# Patient Record
Sex: FEMALE | NOT HISPANIC OR LATINO
[De-identification: names, ages, dates, MRNs, and addresses within clinical notes are randomized per-mention and may not be internally consistent; named-entity substitution may affect disease eponyms.]

---

## 2024-05-20 ENCOUNTER — APPOINTMENT (OUTPATIENT)
Dept: ANTEPARTUM | Facility: CLINIC | Age: 19
End: 2024-05-20
Payer: SELF-PAY

## 2024-05-20 ENCOUNTER — OUTPATIENT (OUTPATIENT)
Dept: INPATIENT UNIT | Facility: HOSPITAL | Age: 19
LOS: 1 days | Discharge: HOME | End: 2024-05-20
Payer: SELF-PAY

## 2024-05-20 ENCOUNTER — ASOB RESULT (OUTPATIENT)
Age: 19
End: 2024-05-20

## 2024-05-20 ENCOUNTER — EMERGENCY (EMERGENCY)
Facility: HOSPITAL | Age: 19
LOS: 1 days | Discharge: STILL ADMITTED OR ADMINISTRATIVE | End: 2024-05-20
Admitting: EMERGENCY MEDICINE

## 2024-05-20 VITALS
SYSTOLIC BLOOD PRESSURE: 105 MMHG | RESPIRATION RATE: 18 BRPM | TEMPERATURE: 98 F | HEART RATE: 66 BPM | DIASTOLIC BLOOD PRESSURE: 67 MMHG | OXYGEN SATURATION: 100 %

## 2024-05-20 VITALS
SYSTOLIC BLOOD PRESSURE: 99 MMHG | TEMPERATURE: 98 F | HEART RATE: 73 BPM | DIASTOLIC BLOOD PRESSURE: 65 MMHG | RESPIRATION RATE: 14 BRPM

## 2024-05-20 VITALS — HEART RATE: 66 BPM | DIASTOLIC BLOOD PRESSURE: 55 MMHG | SYSTOLIC BLOOD PRESSURE: 97 MMHG

## 2024-05-20 DIAGNOSIS — O26.899 OTHER SPECIFIED PREGNANCY RELATED CONDITIONS, UNSPECIFIED TRIMESTER: ICD-10-CM

## 2024-05-20 LAB
APPEARANCE UR: CLEAR — SIGNIFICANT CHANGE UP
BILIRUB UR-MCNC: NEGATIVE — SIGNIFICANT CHANGE UP
COLOR SPEC: YELLOW — SIGNIFICANT CHANGE UP
DIFF PNL FLD: NEGATIVE — SIGNIFICANT CHANGE UP
GLUCOSE UR QL: NEGATIVE MG/DL — SIGNIFICANT CHANGE UP
KETONES UR-MCNC: NEGATIVE MG/DL — SIGNIFICANT CHANGE UP
LEUKOCYTE ESTERASE UR-ACNC: NEGATIVE — SIGNIFICANT CHANGE UP
NITRITE UR-MCNC: NEGATIVE — SIGNIFICANT CHANGE UP
PH UR: 7 — SIGNIFICANT CHANGE UP (ref 5–8)
PROT UR-MCNC: NEGATIVE MG/DL — SIGNIFICANT CHANGE UP
SP GR SPEC: 1.01 — SIGNIFICANT CHANGE UP (ref 1–1.03)
UROBILINOGEN FLD QL: 0.2 MG/DL — SIGNIFICANT CHANGE UP (ref 0.2–1)

## 2024-05-20 PROCEDURE — 76817 TRANSVAGINAL US OBSTETRIC: CPT | Mod: 26

## 2024-05-20 PROCEDURE — L9992: CPT

## 2024-05-20 PROCEDURE — 99213 OFFICE O/P EST LOW 20 MIN: CPT

## 2024-05-20 PROCEDURE — 76815 OB US LIMITED FETUS(S): CPT | Mod: 26

## 2024-05-20 NOTE — OB PROVIDER TRIAGE NOTE - NSHPPHYSICALEXAM_GEN_ALL_CORE
Assessment reveals VSS, abdomen soft, NT.   UA sent    Limited US performed- MVP 4.6, anterior placenta, M-mode 151-saved in asob  CL 3.9 no funneling or dynamic changes Assessment reveals VSS, abdomen soft, NT.   Patient appears calm and comfortable  UA sent    Limited US performed- MVP 4.6, anterior placenta, M-mode 151-saved in asob  CL 3.9 no funneling or dynamic changes    Urinalysis Basic - ( 20 May 2024 19:25 )    Color: Yellow / Appearance: Clear / S.009 / pH: x  Gluc: x / Ketone: Negative mg/dL  / Bili: Negative / Urobili: 0.2 mg/dL   Blood: x / Protein: Negative mg/dL / Nitrite: Negative   Leuk Esterase: Negative / RBC: x / WBC x   Sq Epi: x / Non Sq Epi: x / Bacteria: x Assessment reveals VSS, abdomen soft, NT.   Patient appears calm and comfortable  No CVA tenderness  UA sent    Limited US performed- MVP 4.6, anterior placenta, M-mode 151-saved in asob  CL 3.9 no funneling or dynamic changes    Urinalysis Basic - ( 20 May 2024 19:25 )    Color: Yellow / Appearance: Clear / S.009 / pH: x  Gluc: x / Ketone: Negative mg/dL  / Bili: Negative / Urobili: 0.2 mg/dL   Blood: x / Protein: Negative mg/dL / Nitrite: Negative   Leuk Esterase: Negative / RBC: x / WBC x   Sq Epi: x / Non Sq Epi: x / Bacteria: x

## 2024-05-20 NOTE — ED ADULT TRIAGE NOTE - CHIEF COMPLAINT QUOTE
patient arrives for abdominal cramping. Patient has not seen a OBGYN since arriving from [facility]. First pregnancy. Denies vaginal bleeding.

## 2024-05-20 NOTE — ED ADULT TRIAGE NOTE - PRO INTERPRETER NEED 2
Group Therapy Note    Date: April 22    Group Start Time: 1100  Group End Time: 9073  Group Topic: Psychoeducation    [facility]    Stacy Lakhani, HORTENCIAS    Group Therapy Note    Attendees: 7           Patient's Goal:  Pt will demonstrate improved interpersonal interaction    Notes:  Pt attended group and participated initially but then chose to observe activity due to feeling increased anxiety. Status After Intervention:  Unchanged    Participation Level:  Active Listener    Participation Quality: Appropriate, Sharing and Resistant      Speech:  hesitant      Thought Process/Content: Flight of ideas      Affective Functioning: Constricted/Restricted      Mood: anxious and dysphoric      Level of consciousness:  Alert and Preoccupied      Response to Learning: Able to verbalize current knowledge/experience, Able to retain information, Able to change behavior and Progressing to goal      Endings: None Reported    Modes of Intervention: Education, Support, Socialization, Problem-solving and Activity      Discipline Responsible: Psychoeducational Specialist      Signature:  Emily Manjarrez [facility]

## 2024-05-20 NOTE — OB PROVIDER TRIAGE NOTE - NSOBPROVIDERNOTE_OBGYN_ALL_OB_FT
Plan D/W Dr. Beltran [facility] normal discomforts of pregnancy/round ligament pain  Email sent to M. Hegarty of [facility] to [facility]  Return here for worsening of symptoms, nausea/vomiting, fever, leaking of fluid, vaginal bleeding.     [facility]- [phone] Plan D/W Dr. Beltran [facility] normal discomforts of pregnancy/round ligament pain  Email sent to M. Hegarty of [facility] to [facility]  Return here for worsening of symptoms, nausea/vomiting, fever, leaking of fluid, vaginal bleeding.     [facility]- [phone]    Discharge instructions given with  [id number], all questions answered

## 2024-05-20 NOTE — OB PROVIDER TRIAGE NOTE - HISTORY OF PRESENT ILLNESS
Patient Portuguese speaking, Portuguese  [id number] confirmed with patient that  may translate.   17yo  @ 17.2 presents with c/o lower abdominal pain x 3-4 days. Denies LOF, VB.   Arrived from [place] 1 month ago, last PN visit 4/15, wants to establish care.   Reports she was given due date by US of 10/26

## 2024-05-21 PROBLEM — Z00.00 ENCOUNTER FOR PREVENTIVE HEALTH EXAMINATION: Status: ACTIVE | Noted: 2024-05-21

## 2024-05-22 DIAGNOSIS — Z3A.17 17 WEEKS GESTATION OF PREGNANCY: ICD-10-CM

## 2024-05-22 DIAGNOSIS — O47.1 FALSE LABOR AT OR AFTER 37 COMPLETED WEEKS OF GESTATION: ICD-10-CM

## 2024-08-23 PROBLEM — Z86.79 PERSONAL HISTORY OF OTHER DISEASES OF THE CIRCULATORY SYSTEM: Chronic | Status: ACTIVE | Noted: 2024-05-20

## 2024-08-28 ENCOUNTER — APPOINTMENT (OUTPATIENT)
Dept: ANTEPARTUM | Facility: CLINIC | Age: 19
End: 2024-08-28
Payer: MEDICAID

## 2024-08-28 ENCOUNTER — ASOB RESULT (OUTPATIENT)
Age: 19
End: 2024-08-28

## 2024-08-28 ENCOUNTER — APPOINTMENT (OUTPATIENT)
Dept: OBGYN | Facility: CLINIC | Age: 19
End: 2024-08-28
Payer: MEDICAID

## 2024-08-28 VITALS
WEIGHT: 150 LBS | BODY MASS INDEX: 24.99 KG/M2 | SYSTOLIC BLOOD PRESSURE: 109 MMHG | DIASTOLIC BLOOD PRESSURE: 69 MMHG | HEIGHT: 65 IN

## 2024-08-28 PROCEDURE — ZZZZZ: CPT

## 2024-08-28 PROCEDURE — 76805 OB US >/= 14 WKS SNGL FETUS: CPT

## 2024-08-28 NOTE — DISCUSSION/SUMMARY
[FreeTextEntry1] : -complete sono done today WNL, no gross abnormalities noted (see official sono report) -recommended patient to do GCT as patient hasnt done it yet   -f/u PRN

## 2024-08-28 NOTE — HISTORY OF PRESENT ILLNESS
[FreeTextEntry1] : 19 y/o female presents to office for complete sono. Patient is currently 31 weeks pregnant and just transferred care to  yesterday as she came from [facility]. Complete sono done today.  Normal growth - Wt 4lbs(43%) Normal fluid. Normal movement. +FHR (see official sono report)

## 2024-09-12 ENCOUNTER — APPOINTMENT (OUTPATIENT)
Dept: OBGYN | Facility: CLINIC | Age: 19
End: 2024-09-12

## 2024-10-19 ENCOUNTER — INPATIENT (INPATIENT)
Facility: HOSPITAL | Age: 19
LOS: 3 days | Discharge: HOME | End: 2024-10-23
Attending: SPECIALIST | Admitting: SPECIALIST
Payer: MEDICAID

## 2024-10-19 ENCOUNTER — APPOINTMENT (OUTPATIENT)
Dept: ANTEPARTUM | Facility: CLINIC | Age: 19
End: 2024-10-19

## 2024-10-19 ENCOUNTER — ASOB RESULT (OUTPATIENT)
Age: 19
End: 2024-10-19

## 2024-10-19 VITALS
SYSTOLIC BLOOD PRESSURE: 107 MMHG | DIASTOLIC BLOOD PRESSURE: 67 MMHG | HEART RATE: 89 BPM | RESPIRATION RATE: 16 BRPM | TEMPERATURE: 98 F

## 2024-10-19 DIAGNOSIS — O26.899 OTHER SPECIFIED PREGNANCY RELATED CONDITIONS, UNSPECIFIED TRIMESTER: ICD-10-CM

## 2024-10-19 LAB
BASOPHILS # BLD AUTO: 0.03 K/UL — SIGNIFICANT CHANGE UP (ref 0–0.2)
BASOPHILS NFR BLD AUTO: 0.3 % — SIGNIFICANT CHANGE UP (ref 0–2)
BLD GP AB SCN SERPL QL: NEGATIVE — SIGNIFICANT CHANGE UP
EOSINOPHIL # BLD AUTO: 0.18 K/UL — SIGNIFICANT CHANGE UP (ref 0–0.5)
EOSINOPHIL NFR BLD AUTO: 1.5 % — SIGNIFICANT CHANGE UP (ref 0–6)
HCT VFR BLD CALC: 32.1 % — LOW (ref 34.5–45)
HGB BLD-MCNC: 10.4 G/DL — LOW (ref 11.5–15.5)
IANC: 9 K/UL — HIGH (ref 1.8–7.4)
IMM GRANULOCYTES NFR BLD AUTO: 1 % — HIGH (ref 0–0.9)
LYMPHOCYTES # BLD AUTO: 1.96 K/UL — SIGNIFICANT CHANGE UP (ref 1–3.3)
LYMPHOCYTES # BLD AUTO: 16.5 % — SIGNIFICANT CHANGE UP (ref 13–44)
MCHC RBC-ENTMCNC: 28.8 PG — SIGNIFICANT CHANGE UP (ref 27–34)
MCHC RBC-ENTMCNC: 32.4 GM/DL — SIGNIFICANT CHANGE UP (ref 32–36)
MCV RBC AUTO: 88.9 FL — SIGNIFICANT CHANGE UP (ref 80–100)
MONOCYTES # BLD AUTO: 0.61 K/UL — SIGNIFICANT CHANGE UP (ref 0–0.9)
MONOCYTES NFR BLD AUTO: 5.1 % — SIGNIFICANT CHANGE UP (ref 2–14)
NEUTROPHILS # BLD AUTO: 9 K/UL — HIGH (ref 1.8–7.4)
NEUTROPHILS NFR BLD AUTO: 75.6 % — SIGNIFICANT CHANGE UP (ref 43–77)
NRBC # BLD: 0 /100 WBCS — SIGNIFICANT CHANGE UP (ref 0–0)
NRBC # FLD: 0 K/UL — SIGNIFICANT CHANGE UP (ref 0–0)
PLATELET # BLD AUTO: 274 K/UL — SIGNIFICANT CHANGE UP (ref 150–400)
RBC # BLD: 3.61 M/UL — LOW (ref 3.8–5.2)
RBC # FLD: 13.5 % — SIGNIFICANT CHANGE UP (ref 10.3–14.5)
RH IG SCN BLD-IMP: POSITIVE — SIGNIFICANT CHANGE UP
RH IG SCN BLD-IMP: POSITIVE — SIGNIFICANT CHANGE UP
WBC # BLD: 11.9 K/UL — HIGH (ref 3.8–10.5)
WBC # FLD AUTO: 11.9 K/UL — HIGH (ref 3.8–10.5)

## 2024-10-19 PROCEDURE — 76819 FETAL BIOPHYS PROFIL W/O NST: CPT | Mod: 26

## 2024-10-19 PROCEDURE — 76815 OB US LIMITED FETUS(S): CPT | Mod: 26

## 2024-10-19 RX ORDER — OXYTOCIN IN D5W-0.2% SODIUM CL 15/250 ML
167 PLASTIC BAG, INJECTION (ML) INTRAVENOUS
Qty: 30 | Refills: 0 | Status: DISCONTINUED | OUTPATIENT
Start: 2024-10-19 | End: 2024-10-19

## 2024-10-19 RX ORDER — CHLORHEXIDINE GLUCONATE 40 MG/ML
1 SOLUTION TOPICAL DAILY
Refills: 0 | Status: DISCONTINUED | OUTPATIENT
Start: 2024-10-19 | End: 2024-10-20

## 2024-10-19 RX ORDER — OXYTOCIN IN D5W-0.2% SODIUM CL 15/250 ML
167 PLASTIC BAG, INJECTION (ML) INTRAVENOUS
Qty: 30 | Refills: 0 | Status: COMPLETED | OUTPATIENT
Start: 2024-10-19 | End: 2024-10-19

## 2024-10-19 RX ORDER — CHLORHEXIDINE GLUCONATE 40 MG/ML
1 SOLUTION TOPICAL DAILY
Refills: 0 | Status: DISCONTINUED | OUTPATIENT
Start: 2024-10-19 | End: 2024-10-19

## 2024-10-19 NOTE — OB PROVIDER H&P - HISTORY OF PRESENT ILLNESS
19yo  @ 39.4 presents with c/o contractions every 5 minutes. Denies LOF, VB and reports GFM.   Patient arrived from [place] at around 13 weeks, was seen in triage at 17 weeks and referred to clinic. Patient's  reports clinic did not answer so they started care with Dr. Coley.

## 2024-10-19 NOTE — OB RN PATIENT PROFILE - NS PRO TDAP CONTRAINDICATIONS
PTNS Questionnaire  Treatment # 5    Symptom Follow Up:  Urinary Incontinence:improved    Incomplete Emptying:Yes  Urinary Urgency: improved    Urinary Frequency:  improved    Nocturia: No  Dysuria: No  Leaks during [place]autel: No    Medical Review:  Caffeine Intake ( per day):3 cup  Alcohol Intake (cups/day): 0  cup and none for 3 weeks   Daytime Voids (#/day):8  Nighttime Voids (#/night) with your: 0  Incontinence Episodes (#/day):1 every other day       Prior Testing  Urine Tests: Denies UTI symptoms  Culture: None    Procedure  Needle Placement: right  Treatment Setting:  Stimulation(min): 30  Feeling/Response:  Treatment Plan:    Post Procedure: Patient did tolerate the procedure well.     Patient tolerating well. Continue PTNS.  
None

## 2024-10-19 NOTE — OB PROVIDER TRIAGE NOTE - HISTORY OF PRESENT ILLNESS
17yo  @ 39.4 presents with c/o contractions every 5 minutes. Denies LOF, VB and reports GFM.   Patient arrived from [place] at around 13 weeks, was seen in triage at 17 weeks and referred to clinic. Patient's  reports clinic did not answer so they started care with Dr. Coley.    17yo  @ 39.4 presents with c/o contractions every 5 minutes. Denies LOF, VB and reports GFM.   Patient arrived from [place] at around 13 weeks, was seen in triage at 17 weeks and referred to clinic. Patient's  reports clinic did not answer so they started care with Dr. Coley.

## 2024-10-19 NOTE — OB PROVIDER H&P - NSHPPHYSICALEXAM_GEN_ALL_CORE
Assessment reveals VSS, abdomen soft, NT, gravid.   VE 1/50/-3  Cat 1 FHT, ctx irregular on toco  Reactive NST  BPP 8/8, breech, MALIKA 12, anterior placenta-saved in asob    Patient reports she was told by Dr. Coley last week that she was breech but that she could have normal delivery.   Desires     1640: Chinese  [phone]  Dr. Haywood at bedside. Plan of care discussed and all questions answered.   Consulted with Dr. Shultz of [facility]  Patient for admission and re-check at 1900. If cervical change, for ECV this evening, if no cervical changes, for ECV and IOL in am.

## 2024-10-19 NOTE — OB RN TRIAGE NOTE - FALL HARM RISK - UNIVERSAL INTERVENTIONS
Bed in lowest position, wheels locked, appropriate side rails in place/Call bell, personal items and telephone in reach/Instruct patient to call for assistance before getting out of bed or chair/Non-slip footwear when patient is out of bed/[place] to call system/Physically safe environment - no spills, clutter or unnecessary equipment/Purposeful Proactive Rounding/Room/bathroom lighting operational, light cord in reach

## 2024-10-19 NOTE — OB PROVIDER TRIAGE NOTE - NSHPPHYSICALEXAM_GEN_ALL_CORE
Assessment reveals VSS, abdomen soft, NT, gravid.   VE 1/50/-3  Cat 1 FHT, ctx irregular on toco  BPP Assessment reveals VSS, abdomen soft, NT, gravid.   VE 1/50/-3  Cat 1 FHT, ctx irregular on toco  Reactive NST  BPP 8/8, breech, MALIKA 12, anterior placenta-saved in asob    Patient reports she was told by Dr. Coley last week that she was breech but that she could have normal delivery.   Desires     1640: Occitan  [id number]  Dr. Haywood at bedside. Plan of care discussed and all questions answered.   Consulted with Dr. Shultz of [facility]  Patient for admission and re-check at 1900. If cervical change, for ECV this evening, if no cervical changes, for ECV and IOL in am.

## 2024-10-19 NOTE — CHART NOTE - NSCHARTNOTEFT_GEN_A_CORE
Ob attending    19 yo G1Pp at 39+ weeks breech-  ve 1/70 patient states 1 ctx/hour  no LOF    patient wishes vaginal delivery   cat 1 fht  toco scant    I discussed plan with  Dr. Lexii CACERES-- we will hydrate patient overnight and watch cervical exam -plan for version with epidural in am and induction if successful. If not successful will have c/s tomorrow.  Will recheck exam in 2 hours, if change noted, Dr. Shultz will attempt version tonight with epidural.  Plan discussed with patient and  and agree.      Onel Ruff

## 2024-10-19 NOTE — OB RN PATIENT PROFILE - FALL HARM RISK - UNIVERSAL INTERVENTIONS
Bed in lowest position, wheels locked, appropriate side rails in place/Call bell, personal items and telephone in reach/Instruct patient to call for assistance before getting out of bed or chair/Non-slip footwear when patient is out of bed/[place] to call system/Physically safe environment - no spills, clutter or unnecessary equipment/Purposeful Proactive Rounding/Room/bathroom lighting operational, light cord in reach

## 2024-10-19 NOTE — CHART NOTE - NSCHARTNOTEFT_GEN_A_CORE
pt seen and evalauted at bedside for cervical change    VE unchanged 1/50/-3, firm, posterior, intact     Discussed w. Dr Chastity ALCAZARM

## 2024-10-19 NOTE — OB RN TRIAGE NOTE - FALL HARM RISK - ATTEMPT OOB
Patient : John Tam Age: 85 year old Sex: male   MRN: [id number] Encounter Date: 7/26/2023      History     Chief Complaint   Patient presents with   • Rectal Bleeding     HPI  John Tam is a 85 year old male who presents to the ED with complaints of rectal bleeding.  Extensive past medical history.  He is on aspirin 81, denies any other blood thinners.  He presents two family members.    Patient states that there were some blood spots on the sheets and then today states that there was an increase in blood, even when passing flatus. He notes BRBPR with clots in his brief. No bloody bowel movements.    States that he has had hemorrhoids before which had to be treated with sitz baths. Follows with Dr. Hooks, GI.     Patient denies abdominal pain, nausea, vomiting, diarrhea, lightheadedness, fatigue.  He states that he otherwise feels “fine”.    Reviewed results from his last colonoscopy completed 3/20/2023 for rectal bleeding.  No colitis was seen, no colonic masses are noted.  In the lower rectum there is evidence of a healing stercoral room lower rectal ulcer also radiation proctitis is noted.  There is active oozing from blood vessels just above the anal verge above the dentate line.  This was treated with argon plasma coagulation.    No Known Allergies    Discharge Medication List as of 7/27/2023 12:18 AM      Prior to Admission Medications    Details   oxybutynin (DITROPAN-XL) 10 MG 24 hr tablet Take 10 mg by mouth daily.Historical Med      senna (SENOKOT) 8.6 MG tablet Take 1 tablet by mouth daily.Historical Med      torsemide (DEMADEX) 20 MG tablet Take 2.5 tablets by mouth daily.Historical Med, Disp-60 tablet, R-5This is a dose increase on 05/18/23 that was suggested by oncology.      gabapentin (NEURONTIN) 300 MG capsule Take 3 capsules by mouth nightly.Eprescribe, Disp-90 capsule, R-5      allopurinol (ZYLOPRIM) 300 MG tablet Take 300 mg by mouth daily.Historical Med      potassium chloride  (KLOR-CON) 10 MEQ ER tablet Take 1 tablet by mouth daily.Eprescribe, Disp-90 tablet, R-3      AMIODarone (PACERONE) 100 MG tablet Take 1 tablet by mouth daily.Eprescribe, Disp-90 tablet, R-2      tamsulosin (FLOMAX) 0.4 MG Cap Take 1 capsule by mouth daily.Eprescribe, Disp-90 capsule, R-3      atorvastatin (LIPITOR) 20 MG tablet TAKE 1 TABLET BY MOUTH EVERY DAYEprescribe, Disp-90 tablet, R-3      diclofenac (VOLTAREN) 1 % gel Apply 2 g topically in the morning and 2 g at noon and 2 g in the evening and 2 g before bedtime. Use as directed to right shoulderEprescribe, Topical, 4 TIMES DAILY, Disp-100 g, R-0Osteoarthritis:   - Lower extremity (eg, knee): Apply 4 g to each affe cted area up to 4 times daily; max dose per joint: 16 g/day    - Upper extremity (eg, hand): Apply 2 g to each affected area up to 4 times daily; max dose per joint: 8 g/day   - Max total body dose (all combined joints): 32 g/day     Pain, acute (eg, mus culoskeletal):    - (off-label use) Apply up to 4 g to each affected area up to 4 times daily; max dose per area: 16 g/day   - Max total body dose (all combined areas): 32 g/day.    - NOTE: Dosing is based on general recommendations from  lab eling.            acetaminophen (Tylenol 8 Hour) 650 MG CR tablet Take 1 tablet by mouth 2 times daily. May also take 1 tablet every 4 hours as needed for Pain or Fever (max 4,000 mg/ 24 hour).OTC      ferrous sulfate 325 (65 FE) MG tablet Take 1 tablet by mouth 2 (two) times a day.Historical Med      aspirin 81 MG chewable tablet Chew 81 mg by mouth at bedtime.Historical Med      vitamin B-12 (CYANOCOBALAMIN) 50 MCG tablet Take 50 mcg by mouth daily.Historical Med      Coenzyme Q10 (Co Q10) 200 MG Cap Take 1 capsule by mouth daily.Historical Med      calcium carbonate-vitamin D (CALTRATE+D) 600-400 MG-UNIT per tablet Take 2 tablets by mouth 2 times daily. Historical Med      vitamin - therapeutic multivitamins w/minerals (CENTRUM SILVER,THERA-M)  TABS Take 2 tablets by mouth daily. Historical Med      Polyethylene Glycol Powder Historical Med      DOCUSATE SODIUM PO Historical Med      imatinib (Gleevec) 100 MG tablet Take 300 mg by mouth daily.Historical Med      hydroCORTisone (ANUSOL-HC) 25 MG suppository Place 1 suppository rectally nightly. Take 1 per rectum for 2 weeks and discontinue if symptoms improve. May take up to 1 month if necessary.Eprescribe, Disp-30 suppository, R-1      prochlorperazine (COMPAZINE) 10 MG tablet Take 1 tablet by mouth every 4 hours as needed for Nausea.Historical Med      DISPENSE Orthotic insert for right foot to use with current Chas brace and new brace straps.  NPI: [id number], Disp-1 each, R-0      DISPENSE Right foot Chas Brace. NPI #[id number].Normal, Disp-1 each, R-0      cephalexin (KEFLEX) 500 MG capsule Take 2,000 mg by mouth. Before dental proceduresHistorical Med      Probiotic Product (Probiotic Acidophilus Beads) Cap Probiotic Acidophilus Beads CAPSHistorical Med             Past Medical History:   Diagnosis Date   • Anemia due to gastrointestinal blood loss 02/27/2017    Admitted 2/27/2017 with drop in hemoglobin to 5.4.   • Astigmatism with presbyopia, bilateral     and hypermetropia   • AV block     DDDR pm 8/2012   • Benign prostatic hypertrophy with urinary obstruction    • Bilateral carotid artery disease, nonobstructive    • Bilateral carpal tunnel syndrome    • Bilateral pneumonia 1/5/2015   • Cerebrovascular accident (CMD) 08/2013    Low density foci in the left corona radiata may be related to age-indeterminate infarcts.   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   • Coronary artery disease     CABG 2004   • Dry eye syndrome of both eyes    • Elevated PSA    • Fracture    • Gastroesophageal reflux disease    • Hyperlipidemia    • Hypertension    • Iron deficiency anemia 1/5/2015   • Left ventricular hypertrophy     • Leg weakness 1/18/2015   • Loosening of total shoulder replacement (CMD) 8/28/2021   • Myocardial infarction (CMD)    • Obstructive sleep apnea on CPAP    • Osteoarthritis of multiple joints    • Pacemaker 8/6/2012    DDDR 8/2012 high grade AV block   • Paresthesia of left leg 10/3/2018   • Paroxysmal atrial fibrillation (CMD)    • Peripheral neuropathy    • Pneumonia age 5   • Prostate cancer (CMD) 05/20/2016    [place] score 3+4=7.   • Puckering of macula, bilateral    • Pulmonary hypertension (CMD)    • Pyogenic arthritis of left shoulder region (CMD) 12/2/2021   • Radiation proctitis 6/20/2017    Radiation for prostate cancer is developed radiation proctitis with bleeding complicated by need for anticoagulation for prior stroke  Berny Hooks MD   6/20/2017    • Rectal bleeding 1/27/2021   • Respiratory failure (CMD) 1/5/2015   • Sick sinus syndrome (CMD) 2/28/2013   • Stenosis of left vertebral artery 01/22/2018    Left vertebral artery occlusion with intracranial reconstitution likely from the right vertebral artery.   • Stercoral ulcer of rectum 1/17/2022   • Subclinical hypothyroidism    • Transfusion history 1/2015    2 units   • Urinary incontinence        Past Surgical History:   Procedure Laterality Date   • APPENDECTOMY     • CARDIAC ELECTROPHYSIOLOGY STUDY AND ABLATION  01/28/2015   • CARPAL TUNNEL RELEASE Right 03/28/2017   • CATARACT EXTRACTION, BILATERAL Left 12/01/2008    Right - 11/10/2008   • COLONOSCOPY  01/26/2015    Moderately severe pandiverticulosis, suboptimal bowel prep, follow up in 3 years. Dr. Berny Hooks.   • COLONOSCOPY  03/01/2017    Multiple polyps, sigmoid diverticulosis, internal hemorrhoids, follow up in 3 years. Dr. Jamey Weber.   • COLONOSCOPY  05/19/2017    Radiation proctitis with multiple neovascularization lesions (AVM) active bleeding with fresh blood, hemostasis achieved with argon plasma coagulation with ablation of AVMs. Sigmoid diverticulosis. Internal  No hemorrhoids. Fresh blood and the clots in the rectum and sigmoid colon and stool in the right colon.   • COLONOSCOPY  06/09/2017    Sigmoid colon polyp, pandiverticulosis mild to moderate in severity, rectal bleeding secondary to post radiation proctopathy, follow up in 5 years.   • COLONOSCOPY  06/12/2018    Radiation proctitis with multiple neovascularization lesions (AVM) with fresh blood, hemostasis achieved with argon plasma coagulation with ablation of AVMs, ascending colon polyp 7 m removed by snare polypectomy, hepatic flexure polyp 7 mm removed by snare polypectomy, sigmoid diverticulosis, and a rectal polyp 10 mm sessile removed by snare polypectomy and cautery. Follow up in 3 years.   • COLONOSCOPY  07/08/2021   • COLONOSCOPY  03/20/2023    polyp removed. Follow-up colonoscopy as needed.   • CORONARY ANGIOGRAM - CV  2010    patent LIMA, 80% ostial radial with FFR .88, presumed spasm, 75% LAD, 50% OM, 30-35% RCA, LM 40-50%   • CORONARY ARTERY BYPASS GRAFT  05/25/2004    2-vessel off-pump coronary artery bypass. LIMA-LAD, radial-OM.   • EGD  07/08/2021   • ESOPHAGOGASTRODUODENOSCOPY  01/09/2015    Gastritis, otherwise normal. Dr. Berny Hooks.   • ESOPHAGOGASTRODUODENOSCOPY  02/28/2017    Gastritis, no active bleeding or bleeding stigmata. Dr. Jamey Weber.   • ESOPHAGOGASTRODUODENOSCOPY  05/19/2017    Gastritis. No active bleeding.   • EXCISIONAL HEMORRHOIDECTOMY  01/26/2018    Grade 4, 3 Columns- Dr Joe Bacon MD    • FLEXIBLE SIGMOIDOSCOPY  07/21/2017    Nonbleeding external hemorrhoids. Bleeding radiation proctopathy with associated ulceration.   • FLEXIBLE SIGMOIDOSCOPY  01/12/2018    Large external hemorrhoids, residual but improved radiation proctopathy, mild diverticulosis of the distal sigmoid colon.   • FOOT SURGERY Left    • HB CAPSULOTOMY W/LASER (YAG) Bilateral 06/01/2012   • HB ENDOSCOPY CAPSULE GI TRACT  03/08/2017    Negative.   • LEFT HEART CATH,PERCUTANEOUS  05/24/2004   • LUMBAR  FUSION  10/01/2014    L2-L5. Dr. Joe Gallegos.   • OSTEOTOMY TIBIA     • PACEMAKER IMPLANT  2012    Medtronic DDDR   • PTCA  2010   • QUADRICEPS REPAIR Right 2007    Right knee distal quadraceps tendon repair, [place] or [place] type. Dr. Kirby Hanson.   • SKIN CANCER EXCISION  04/10/2008    Basal cell carcinoma from right anterior shoulder.   • TONSILLECTOMY AND ADENOIDECTOMY     • TOTAL HIP ARTHROPLASTY Right    • TOTAL KNEE ARTHROPLASTY Left    • TOTAL SHOULDER ARTHROPLASTY Right 2004    Dr. Kvng Shipman.       Family History   Problem Relation Age of Onset   • Heart disease Father         Valvular   • Dementia/Alzheimers Mother    • Osteoarthritis Mother    • Thyroid Mother    • Alcohol Abuse Sister    • Hypertension Sister    • Diabetes Maternal Grandmother        Social History     Tobacco Use   • Smoking status: Former     Current packs/day: 0.00     Average packs/day: 1 pack/day for 16.0 years (16.0 ttl pk-yrs)     Types: Cigarettes     Start date: 1961     Quit date: 1977     Years since quittin.6   • Smokeless tobacco: Never   Vaping Use   • Vaping Use: never used   Substance Use Topics   • Alcohol use: Yes     Alcohol/week: 7.0 standard drinks of alcohol     Types: 7 Glasses of wine per week   • Drug use: No       E-cigarette/Vaping   • E-Cigarette/Vaping Use Never Used      E-Cigarette/Vaping Substances & Devices       Review of Systems  10 SYSTEMS REVIEWED AND NEGATIVE OR NONCONTRIBUTORY UNLESS OTHERWISE NOTED IN HPI    Physical Exam     ED Triage Vitals [23 2305]   ED Triage Vitals Group      Temp 97.8 °F (36.6 °C)      Heart Rate 65      Resp 20      /61      SpO2 96 %      EtCO2 mmHg       Height 5' 7\" (1.702 m)      Weight 173 lb 1 oz (78.5 kg)      Weight Scale Used Standing scale      BMI (Calculated) 27.1      IBW/kg (Calculated) 66.1       Physical Exam  Nursing notes reviewed  Gen:   AAO in NAD, resting comfortably in bed. Answers questions  appropriately and follows commands appropriately. Appears well nourished and stated age  Head:  atraumatic  HEENT:   PERRL. Sclera anicteric   Mouth:  Patent without swelling.  Moist well perfused mucous membranes. Trachea midline, palate raise symmetric  Neck: No masses, lymphadenopathy, moving head freely  Resp: Clear to auscultation without wheezes, rhonchi, or rales.  CVS: Rate rhythm regular  ABD:  Positive BS, nontender, soft. no guarding or rebound  : rectal exam reveals hematochezia. No palpable hemorrhoids.  Back: No CVA tenderness, deformities, swelling or ecchymosis  Ext:  No clubbing, cyanosis. Bilateral 1+ edema of lower legs.  No joint swelling or erythema  Skin:  Well perfused, no significant rashes. No jaundice  Neuro: Face symmetric. Conjugate gaze. Speech clear. No obvious focal deficit  Lymph: No significant cervical lymphadenopathy  Psych: Alert and interactive with normal affect and interaction  ED Course     Procedures    Lab Results     Results for orders placed or performed during the hospital encounter of 07/26/23   Comprehensive Metabolic Panel   Result Value Ref Range    Fasting Status      Sodium 141 135 - 145 mmol/L    Potassium 3.6 3.4 - 5.1 mmol/L    Chloride 106 97 - 110 mmol/L    Carbon Dioxide 30 21 - 32 mmol/L    Anion Gap 9 7 - 19 mmol/L    Glucose 106 (H) 70 - 99 mg/dL    BUN 39 (H) 6 - 20 mg/dL    Creatinine 1.42 (H) 0.67 - 1.17 mg/dL    Glomerular Filtration Rate 48 (L) >=60    BUN/Cr 27 (H) 7 - 25    Calcium 8.7 8.4 - 10.2 mg/dL    Bilirubin, Total 0.5 0.2 - 1.0 mg/dL    GOT/AST 44 (H) <=37 Units/L    GPT/ALT 45 <64 Units/L    Alkaline Phosphatase 105 45 - 117 Units/L    Albumin 3.6 3.6 - 5.1 g/dL    Protein, Total 6.5 6.4 - 8.2 g/dL    Globulin 2.9 2.0 - 4.0 g/dL    A/G Ratio 1.2 1.0 - 2.4   Prothrombin Time (INR/PT)   Result Value Ref Range    Protime- PT 11.1 9.7 - 11.8 sec    INR 1.1     Partial Thromboplastin Time (PTT)   Result Value Ref Range    PTT 29 22 - 30 sec    CBC with Automated Differential (performable only)   Result Value Ref Range    WBC 6.1 4.2 - 11.0 K/mcL    RBC 3.04 (L) 4.50 - 5.90 mil/mcL    HGB 10.0 (L) 13.0 - 17.0 g/dL    HCT 30.7 (L) 39.0 - 51.0 %    .0 (H) 78.0 - 100.0 fl    MCH 32.9 26.0 - 34.0 pg    MCHC 32.6 32.0 - 36.5 g/dL    RDW-CV 17.8 (H) 11.0 - 15.0 %    RDW-SD 66.3 (H) 39.0 - 50.0 fL     140 - 450 K/mcL    NRBC 0 <=0 /100 WBC    Neutrophil, Percent 77 %    Lymphocytes, Percent 10 %    Mono, Percent 11 %    Eosinophils, Percent 1 %    Basophils, Percent 1 %    Immature Granulocytes 0 %    Absolute Neutrophils 4.7 1.8 - 7.7 K/mcL    Absolute Lymphocytes 0.6 (L) 1.0 - 4.0 K/mcL    Absolute Monocytes 0.7 0.3 - 0.9 K/mcL    Absolute Eosinophils  0.0 0.0 - 0.5 K/mcL    Absolute Basophils 0.0 0.0 - 0.3 K/mcL    Absolute Immature Granulocytes 0.0 0.0 - 0.2 K/mcL   Stool occult blood POC   Result Value Ref Range    gFOB (guaiac) - Occult Blood Positive Negative    Internal Procedural Controls Acceptable Yes     TEST LOT NUMBER [id number]     TEST LOT EXPIRATION DATE 2-26        EKG Results     Radiology Results     Imaging Results    None         ED Medication Orders (From admission, onward)    None               [facility]  Patient is an 85-year-old male who presents with his family members for concern of bright red blood per rectum.  Patient otherwise states he has been feeling himself, no abdominal pain, nausea vomiting, fevers, lightheadedness or fatigue.  Differential broad.  Labs obtained showed hemoglobin 10.0, this is increased from his last hemoglobin of 7.8 which was obtained for months ago.  He is without signs of large blood loss anemia on exam and HPI.  Creatinine is elevated to 1.42 from his baseline of 1.02.  Unfortunately, nursing staff did lose IV access, and therefore the decision to not give a small IV bolus of fluids was made in conjunction with Dr. Johnson.  I did recommend that he increase his water intake in order to compensate  for this.  BUN/creatinine elevated to 27, BUN elevated to 39.  Suspect secondary to acute GI blood loss.  Patient is on baby aspirin for his history of AFib.  He does state that he is too afraid to stop taking those due to risk of increased clots.  I will have him talk to GI doctor to see if they would like him to stop this.   I did discuss with patient that at this time I think that he would be reasonable for outpatient follow-up closely with Dr. Hooks, and GI.  Patient does feel comfortable with this decision.  I did place an ASA referral for him to get a scope completed.  I recommend close follow-up, returning if he has 3 large bloody bowel movements or if he starts to have symptoms secondary to anemia as he would need his labs rechecked.  I also recommend follow-up with his primary care to ensure his creatinine is returning to baseline, do recommend that he have this redrawn on Monday.    Clinical Impression     ED Diagnosis   1. Blood in stool  SERVICE TO GASTROENTEROLOGY      2. Elevated serum creatinine            Disposition        Discharge 7/27/2023 12:17 AM  John Tam discharge to home/self care.      Treatment:  Discharge Medication List as of 7/27/2023 12:18 AM          Follow Up:  Berny Hooks MD  [address] [facility] [address] [phone]    Schedule an appointment as soon as possible for a visit         Discussed with patient ED findings and plan for discharge. Patient was given ED return precaution warnings, discharge instructions, and follow up information to go home with. Patient understands and agrees with plan for discharge. Any questions have been answered.     The patient is discharged home in stable condition. See discharge papers for discharge instructions and attachments.    Closure:  The patient understands that this is a provisional diagnosis. Provisional diagnosis can and do change. The diagnosis that he is discharged with today is based on the symptoms with which  he presented today. If any new symptoms occur or worsen, he should seek immediate attention for re-evaluation.  Any symptoms that persist or fail to completely resolve require further evaluation by his other healthcare provider(s).         Mariela Belle PA-C  07/28/23 [id number]

## 2024-10-19 NOTE — OB PROVIDER TRIAGE NOTE - NSOBPROC_OBGYN_ALL_OB
Problem: Physical Therapy  Goal: Physical Therapy Goal  Description: Goals to be met by: d/c     Patient will increase functional independence with mobility by performin. Supine to sit with Stand-by Assistance  2. Sit to supine with Stand-by Assistance  3. Sit to stand transfer with Stand-by Assistance  4. Bed to chair transfer with Stand-by Assistance using Rolling Walker  5. Gait  x 30 feet with Stand-by Assistance using Standard Walker.     Outcome: Ongoing, Progressing      None Done

## 2024-10-19 NOTE — OB PROVIDER TRIAGE NOTE - AVIAN FLU SYMPTOMS
December 12, 2023         Patient: Jhoana Freeman   YOB: 2017   Date of Visit: 12/12/2023           To Whom it May Concern:    Jhoana Freeman was seen in my clinic on 12/12/2023. Please excuse her school absence today.     If you have any questions or concerns, please don't hesitate to call.        Sincerely,           Mildred Gordon M.D.  Electronically Signed     
No

## 2024-10-19 NOTE — OB PROVIDER H&P - NSLOWPPHRISK_OBGYN_A_OB
No previous uterine incision/[place] Pregnancy/Less than or equal to 4 previous vaginal births/No known bleeding disorder

## 2024-10-19 NOTE — OB PROVIDER H&P - ASSESSMENT
Admit for early labor in breech presentation  Routine admission orders  For re-check at 1900.      If cervical change than ECV will be attempted this evening, if no change than ECV and IOL in AM  Pain management PRN  D/W Dr. Chastity Hayes  used, all questions answered.     Risks, benefits, alternatives, and possible complications have been discussed in detail with the patient in her native language. Pre-admission, admission, and post admission procedures and expectations were discussed in detail. All questions answered, all appropriate hospital consents were signed. Anticipate normal vaginal delivery.    Informed consent was obtained. The following was discussed:    - Induction/augmentation of labor: use of medication and/or cook balloon to begin or enhance labor    - Obstetrical management including internal fetal/contraction monitoring    - Normal vaginal delivery    - Possible  section

## 2024-10-20 ENCOUNTER — TRANSCRIPTION ENCOUNTER (OUTPATIENT)
Age: 19
End: 2024-10-20

## 2024-10-20 LAB
HCV AB S/CO SERPL IA: 0.19 S/CO — SIGNIFICANT CHANGE UP (ref 0–0.99)
HCV AB SERPL-IMP: SIGNIFICANT CHANGE UP

## 2024-10-20 PROCEDURE — 99221 1ST HOSP IP/OBS SF/LOW 40: CPT | Mod: 25

## 2024-10-20 PROCEDURE — 59412 ANTEPARTUM MANIPULATION: CPT

## 2024-10-20 DEVICE — INTERCEED 3 X 4": Type: IMPLANTABLE DEVICE | Status: FUNCTIONAL

## 2024-10-20 RX ORDER — MAGNESIUM HYDROXIDE 1200 MG/15ML
30 SUSPENSION ORAL
Refills: 0 | Status: DISCONTINUED | OUTPATIENT
Start: 2024-10-20 | End: 2024-10-23

## 2024-10-20 RX ORDER — MODIFIED LANOLIN
1 OINTMENT (GRAM) TOPICAL EVERY 6 HOURS
Refills: 0 | Status: DISCONTINUED | OUTPATIENT
Start: 2024-10-20 | End: 2024-10-23

## 2024-10-20 RX ORDER — CITRIC ACID/SODIUM CITRATE 334-500MG
30 SOLUTION, ORAL ORAL ONCE
Refills: 0 | Status: COMPLETED | OUTPATIENT
Start: 2024-10-20 | End: 2024-10-20

## 2024-10-20 RX ORDER — CLOSTRIDIUM TETANI TOXOID ANTIGEN (FORMALDEHYDE INACTIVATED), CORYNEBACTERIUM DIPHTHERIAE TOXOID ANTIGEN (FORMALDEHYDE INACTIVATED), BORDETELLA PERTUSSIS TOXOID ANTIGEN (GLUTARALDEHYDE INACTIVATED), BORDETELLA PERTUSSIS FILAMENTOUS HEMAGGLUTININ ANTIGEN (FORMALDEHYDE INACTIVATED), BORDETELLA PERTUSSIS PERTACTIN ANTIGEN, AND BORDETELLA PERTUSSIS FIMBRIAE 2/3 ANTIGEN 5; 2; 2.5; 5; 3; 5 [LF]/.5ML; [LF]/.5ML; UG/.5ML; UG/.5ML; UG/.5ML; UG/.5ML
0.5 INJECTION, SUSPENSION INTRAMUSCULAR ONCE
Refills: 0 | Status: DISCONTINUED | OUTPATIENT
Start: 2024-10-20 | End: 2024-10-23

## 2024-10-20 RX ORDER — HEPARIN SODIUM 10000 [USP'U]/ML
5000 INJECTION INTRAVENOUS; SUBCUTANEOUS EVERY 12 HOURS
Refills: 0 | Status: DISCONTINUED | OUTPATIENT
Start: 2024-10-20 | End: 2024-10-23

## 2024-10-20 RX ORDER — FAMOTIDINE 10 MG/ML
20 INJECTION INTRAVENOUS ONCE
Refills: 0 | Status: COMPLETED | OUTPATIENT
Start: 2024-10-20 | End: 2024-10-20

## 2024-10-20 RX ORDER — KETOROLAC TROMETHAMINE 30 MG/ML
30 INJECTION INTRAMUSCULAR; INTRAVENOUS EVERY 6 HOURS
Refills: 0 | Status: DISCONTINUED | OUTPATIENT
Start: 2024-10-20 | End: 2024-10-21

## 2024-10-20 RX ORDER — NALOXONE HYDROCHLORIDE 1 MG/ML
0.1 INJECTION, SOLUTION INTRAMUSCULAR; INTRAVENOUS; SUBCUTANEOUS
Refills: 0 | Status: DISCONTINUED | OUTPATIENT
Start: 2024-10-20 | End: 2024-10-22

## 2024-10-20 RX ORDER — DIPHENHYDRAMINE HCL 12.5MG/5ML
25 ELIXIR ORAL EVERY 6 HOURS
Refills: 0 | Status: COMPLETED | OUTPATIENT
Start: 2024-10-20 | End: 2025-09-18

## 2024-10-20 RX ORDER — IBUPROFEN 200 MG
600 TABLET ORAL EVERY 6 HOURS
Refills: 0 | Status: COMPLETED | OUTPATIENT
Start: 2024-10-20 | End: 2025-09-18

## 2024-10-20 RX ORDER — OXYTOCIN IN D5W-0.2% SODIUM CL 15/250 ML
42 PLASTIC BAG, INJECTION (ML) INTRAVENOUS
Qty: 30 | Refills: 0 | Status: DISCONTINUED | OUTPATIENT
Start: 2024-10-20 | End: 2024-10-22

## 2024-10-20 RX ORDER — OXYCODONE HYDROCHLORIDE 30 MG/1
5 TABLET ORAL
Refills: 0 | Status: DISCONTINUED | OUTPATIENT
Start: 2024-10-20 | End: 2024-10-23

## 2024-10-20 RX ORDER — OXYCODONE HYDROCHLORIDE 30 MG/1
5 TABLET ORAL ONCE
Refills: 0 | Status: DISCONTINUED | OUTPATIENT
Start: 2024-10-20 | End: 2024-10-23

## 2024-10-20 RX ORDER — ONDANSETRON HYDROCHLORIDE 2 MG/ML
4 INJECTION, SOLUTION INTRAMUSCULAR; INTRAVENOUS EVERY 6 HOURS
Refills: 0 | Status: DISCONTINUED | OUTPATIENT
Start: 2024-10-20 | End: 2024-10-22

## 2024-10-20 RX ORDER — SIMETHICONE 80 MG/1
80 TABLET, CHEWABLE ORAL EVERY 4 HOURS
Refills: 0 | Status: DISCONTINUED | OUTPATIENT
Start: 2024-10-20 | End: 2024-10-23

## 2024-10-20 RX ORDER — ACETAMINOPHEN 500 MG
975 TABLET ORAL
Refills: 0 | Status: DISCONTINUED | OUTPATIENT
Start: 2024-10-20 | End: 2024-10-23

## 2024-10-20 RX ORDER — PRENATAL VIT/IRON FUM/FOLIC AC 60 MG-1 MG
0 TABLET ORAL
Refills: 0 | DISCHARGE

## 2024-10-20 RX ORDER — DEXAMETHASONE 1.5 MG 1.5 MG/1
4 TABLET ORAL EVERY 6 HOURS
Refills: 0 | Status: DISCONTINUED | OUTPATIENT
Start: 2024-10-20 | End: 2024-10-22

## 2024-10-20 RX ADMIN — FAMOTIDINE 20 MILLIGRAM(S): 10 INJECTION INTRAVENOUS at 20:00

## 2024-10-20 RX ADMIN — Medication 167 MILLIUNIT(S)/MIN: at 22:15

## 2024-10-20 RX ADMIN — Medication 0.25 MILLIGRAM(S): at 16:03

## 2024-10-20 RX ADMIN — Medication 30 MILLILITER(S): at 20:00

## 2024-10-20 RX ADMIN — CHLORHEXIDINE GLUCONATE 1 APPLICATION(S): 40 SOLUTION TOPICAL at 18:27

## 2024-10-20 RX ADMIN — Medication 125 MILLILITER(S): at 19:59

## 2024-10-20 NOTE — OB NEONATOLOGY/PEDIATRICIAN DELIVERY SUMMARY - NSPEDSNEONOTESA_OBGYN_ALL_OB_FT
Peds called to OR for 39.5 wk AGA female born via CS to a 20y/o G1 mother.  Maternal history of ovarian cysts, late transfer of care from [place]. Maternal labs include Blood Type A+ , HIV - , RPR NR , Rubella I , Hep B - , GBS - . ROM at delivery  with clear fluids (ROM hours: 0H_M).  Baby emerged stunned, without respiratory effort, HR<100. NICU Resuscitation was called. Resuscitation included PPV started at 30 seconds of life for 30 seconds, at which point  began spontaneously breathing. CPAP was initiated with max settings of 5/30%. Infant remained on CPAP for 13 minutes and was weaned to room air with o2 saturations >90% with appropriate respiratory effort. APGARS of 5/8 . Nuchal x1. Mom plans to initiate formula feed, consents to Hep B vaccine.  Highest maternal temp: 36.9. EOS 0.03    : 10/20/24  TOB: 21:02  Weight: 3030g

## 2024-10-20 NOTE — DISCHARGE NOTE OB - CARE PROVIDER_API CALL
All Kunz  Obstetrics and Gynecology  [address] [place] [address]  Phone: [phone]  Fax: [phone]  Follow Up Time:

## 2024-10-20 NOTE — OB PROVIDER DELIVERY SUMMARY - NSSELHIDDEN_OBGYN_ALL_OB_FT
[[email or web address]_OB_FT:[id number]],[NS_[email or web address]_ALL_OB_FT:[id number]],[NS_DeliveryRN_OBGYN_ALL_OB_FT:[id number]]

## 2024-10-20 NOTE — DISCHARGE NOTE OB - MEDICATION SUMMARY - MEDICATIONS TO TAKE
I will START or STAY ON the medications listed below when I get home from the hospital:  None I will START or STAY ON the medications listed below when I get home from the hospital:    ibuprofen 600 mg oral tablet  -- 1 tab(s) by mouth every 6 hours as needed for  mild pain  -- Indication: For  delivery delivered    acetaminophen 325 mg oral tablet  -- 3 tab(s) by mouth every 6 hours as needed for  mild pain  -- Indication: For  delivery delivered    multivitamin, prenatal  -- 1 tab(s) by mouth once a day  -- Indication: For  delivery delivered

## 2024-10-20 NOTE — OB RN INTRAOPERATIVE NOTE - NS_DISCHARGEDTO_OBGYN_ALL_OB
Subjective   Patient ID: Brett is a 71 year old female who presents for her Subsequent Annual Medicare Wellness Visit.    Chief Complaint   Patient presents with   • Office Visit   • Medicare Wellness Visit       Patient Answered Medicare HRA Screening Questions  1.) Do you have an Advance directive, living will, or power of  for health care document that contains your wishes for end of life care? Yes    2.) Would you like additional information on advance directives? No    3.) During the past 4 weeks, how would you rate your health? Good    4.) During the past 4 weeks, what was the hardest physical activity you could do for at least 2 minutes? Very Light    5.) Do you do moderate to strenuous exercise (brisk walk) for about 20 minutes for 3 or more days per week? No, but I am active with housework or yard work (vacuuming, raking, etc.)    6.) How many servings of the following would you typically eat in a day?  Fruits and Vegetables (1 serving = 1 piece of fruit, 1/2 cup fruits or vegetables) 2-3 per day  High Fiber / Whole Grain Foods (1 serving = 1 cup cold cereal, 1/2 cup cooked cereal, 1 slice bread) 2-3 per day  Fried or High Fat Foods (1 serving = 1 Javier, French Fries, chips, doughnut, fried chicken/fish) 1 per day  Sugar Sweetened Beverages (1 serving = 1 can or 12 oz cup of soda or juice) None    7.) Have you had a fall two or more times in the past year? No    8.) During the past 4 weeks, has your physical and emotional health limited your social activities with family, friends, neighbors, or other groups? Not at all    9.) Do you feel safe at home? Yes    10.) How often do you have trouble taking medicines the way you have been told to take them? I always take my prescribed medications    11.) Over the past 4 weeks how often have you experienced the following?  Bladder Control problems - (urine leaking)Seldom  Bowel control problems - Never  Teeth or Denture Problems - Never  Bodily pain -  Sometimes  Tiredness or Fatigue - Sometimes  Feeling stressed or overwhelmed - Sometimes  Anger or frustration - Never  Problems with your hearing - Never  Problems using the telephone - Never  Problems with your balance - Never  Driven/Ridden in a car without wearing your seatbelt - Never  Sexual Problems - Never    12.) Do you need help with any of the following activities (bathing, grooming, feeding, toileting, getting out of bed/chairs)? None of these apply to me    13.) Do you need help with any of the following activities (going to places outside of walking distance, shopping, housework/laundry, preparing meals, handling own money)? None of these apply to me    14.) During the past 4 weeks, was someone available to help if you needed and wanted help? Yes, as much as I wanted    15.) How confident are you that you can control and manage most of your health problems? Very confident    Brett has a past medical history of Bladder carcinoma (CMS/HCC), Cataract, Dyslipidemia, Elevated blood-pressure reading without diagnosis of hypertension, Hilar adenopathy, Lung nodules, Osteopenia, PSVT (paroxysmal supraventricular tachycardia) (CMS/HCC), and Vitamin D deficiency.    Brett has PSVT (paroxysmal supraventricular tachycardia) (CMS/HCC); Palpitations; Bladder cancer (CMS/HCC); Dyslipidemia; Lung nodules; Osteopenia; Preoperative clearance; Elevated BP without diagnosis of hypertension; Vitamin D deficiency; Low vitamin B12 level; Need for pneumococcal vaccine; Visit for screening mammogram; Need for immunization against influenza; and Essential hypertension on their problem list.    Brtet has a past surgical history that includes Colonoscopy (09/23/2013); Transurethral resection of bladder tumor (09/22/2017); and Transurethral resection of bladder tumor (11/08/2019).    Her family history includes Hypertension in her father; Myocardial Infarction in her father and mother.    Brett reports that she has quit  smoking. She has never used smokeless tobacco. She reports current alcohol use. She reports that she does not use drugs.    Brett has No Known Allergies.    Brett   Current Outpatient Medications   Medication Sig Dispense Refill   • Metoprolol Succinate 100 MG Capsule ER 24 Hour Sprinkle Take 100 mg by mouth daily. 90 capsule 2     No current facility-administered medications for this visit.        [id number] - [place], [place] - [address] [place] [address] AT [facility] [address]  [address] [facility] [address]  Phone: [phone] Fax: [phone]    [facility] - [place], [place] - [address] AT [address] [facility]  [address]  [facility] [address]  Phone: [phone] Fax: [phone]      Patient Care Team:  Remy Verdugo, DO as PCP - General    Screenings  ADLs                 iADLs       Home Safety: HAS WORKING SMOKE DETECTORS, RAILS ON STAIRWAY, GOOD LIGHTING, NO LOOSE RUGS, SLIPPERY BATHTUB/SHOWER     Depression PHQ2/9:            Depression assessment/plan: Depression screening is negative no further plan needed.     Cognitive/Functional Status:     Listen carefully and repeat the following:  Apple  Watch  Clementine: Immediate repeat back - Apple Watch Clementine  Patient able to fill in the clock face with with 45 minutes past 10 o'clock?: No, clock is not correct  Able to repeat the 3 words given previously?: Apple, Watch, Clementine  Total Score: 0  Positive for cognitive impairment  Cognitive Assessment: no evidence of cognitive dysfunction by direct observation    STEADI-Fall Risk       Hearing Impairment:    Vision/Hearing Screening:    Hearing Screening    125Hz 250Hz 500Hz 1000Hz 2000Hz 3000Hz 4000Hz 6000Hz 8000Hz   Right ear:            Left ear:            Comments: WHISPER TEST PASSED BILATERALLY       Advanced care planning: Durable Power of  for Health Care on file in chart    Needed Screening/Treatment:   None     Needed follow  up:  None    HPI  Review of Systems    Objective   Vitals: T= 98.1 P=66 R=17 B/P=137/75   height 5'9\"  weight 81.9  LB  BMI. 26.68  BMI ASSESSMENT/PLAN:  Patient is overweight.    30 minutes of physical activity a day        Physical Exam    Assessment   Problem List Items Addressed This Visit     None          Schedule follow up: in a year, at next annual exam    Screening schedule/checklist for next 5-10 years:  Health Maintenance Due   Topic Date Due   • Shingles Vaccine (1 of 2) 02/25/1998   • Colorectal Cancer Screen-  02/25/1998   • Hepatitis C Screening  02/25/1999   • Medicare Wellness Visit  11/01/2018        A written education, counseling, referral, and plan for obtaining appropriate screening services has been given to patient. See patient instructions.    L&D Pacu

## 2024-10-20 NOTE — OB RN DELIVERY SUMMARY - NS_SEPSISRSKCALC_OBGYN_ALL_OB_FT
EOS calculated successfully. EOS Risk Factor: 0.5/1000 live births ([facility] national incidence); GA=39w5d; Temp=98.4; ROM=0; GBS='Negative'; Antibiotics='Broad spectrum antibiotics 2-3.9 hrs prior to birth'

## 2024-10-20 NOTE — PROCEDURAL SAFETY CHECKLIST WITH OR WITHOUT SEDATION - NSSTERILITYCONFIRMD_GEN_ALL_CORE
PT Acute Evaluation:     Therapy Triage Evaluation: OT evaluation is not warranted at this time.  Pt is Total Assist for ADLs, completed via approved triage process to assess safety, balance, mobility, memory, cognition and functional independence.  Results and recommendations discussed with Occupational therapist,, RN, and and patient/family..          Pt seen on [facility] nursing unit.                                                Frequency Comments: X, M (1-2 sessions, close to baseline)    RECOMMENDATIONS FOR DISCHARGE:  Recommendation for Discharge: PT: 24 Hour assist;Home (12/09/17 0719)                                                                                                                 Admitting complaint: Orthostatic hypotension [I95.1]  Syncope and collapse [R55]  Subtherapeutic anticoagulation [Z51.81, Z79.01]                                     Precautions  Other Precautions: fall precautions (12/09/17 0719)    SUBJECTIVE: Patient's Personal Goal: none stated (12/09/17 0719)  Subjective: pt agreeable to therapy \"Yes\", limited verbal response throughout session, \"Yes\" to name, \"uh-huh\" to most questions (12/09/17 0719)  Subjective/Objective Comments: chart reviewed, session cleared with Jenna DE LA CRUZ prior to start and updated following session (12/09/17 0719)    OBJECTIVE:  Basic Lines: Telemetry;Capped IV (12/09/17 0719)  Safety Measures: Alarms;Other (comment) (call light in reach) (12/09/17 0719)    RN reported Sorensen Fall Scale Score: 85    Co-morbidities:   Patient Active Problem List   Diagnosis   • SSS (sick sinus syndrome) (CMS/HCC)   • Syncope   • St Tay Dual Chamber Pacemaker 11/28/2009   • Other and unspecified hyperlipidemia   • DJD right knee lateral compartment   • Genu valgum - right   • Occasional tremors   • Chronic anticoagulation on Warfarin for PAF   • Paroxysmal atrial fibrillation (CMS/HCC)   • Right knee pain   • Primary osteoarthritis of right knee   • Status post total right  knee replacement   • Late onset Alzheimer's disease without behavioral disturbance       Clinical presentation: evolving clinical presentation with changing characteristics      ASSESSMENT:   Patient is seen for physical therapy initial evaluation during hospital stay for syncope and UTI with PMH including dementia, HTN, HLD, CHF, and depression/anxiety. Patient is likely presenting slightly below baseline for functional mobility observed during session- writer will continue to confirm PLOF with family as available. Patient currently requires supervision- min assist for bed mobility, min assist for transfers and min assist for ambulation. Patient would benefit from skilled therapy services during hospital stay to further address listed deficits. Patient is likely to discharge home with 24 hour assist when medically stable.      Addendum: Spoke with pts son to confirm PLOF. Family assists with all ADLs and has 24 hour supervision for mobility. Son reports pt has been more \"wobbly\" recently and interested in therapy at adult day care to work on strengthening and mobility. Triage OT Out given pt is total assist for self cares at baseline.        EDUCATION:   On this date, the patient was educated on role of PT in acute care, safety with mobility and importance of activity.    The response to education was: Needs reinforcement    PT Identified Barriers to Discharge: medical     PLAN:   Continue skilled PT, including the following Treatment/Interventions: Functional transfer training;Strengthening;Endurance training;Bed mobility;Gait training;Stairs retraining;Safety Education (12/09/17 0719)   Frequency Comments: X, M (1-2 sessions, close to baseline) (12/09/17 0719)    Treatment Plan for Next Session: progress ambulation, confirm PLOF with family, stairs assessment          RECOMMENDATIONS FOR DISCHARGE:  Recommendation for Discharge: PT: 24 Hour assist;Home (12/09/17 0719)    PT/OT Mobility Equipment for Discharge:  continue to assess (12/09/17 0719)  PT/OT ADL Equipment for Discharge: continue to assess (12/09/17 0719)    ICU Mobility Assesment (PERME):       Last 24 hours of Functional Data  Bed Mobility   Bed Mobility  Rolling to the Right: Supervision (Supv) (12/09/17 0719)  Rolling to the Left: Supervision (Supv) (12/09/17 0719)  Supine to Sit: Minimal Assist (Min) (12/09/17 0719)  Sit to Supine: Supervision (Supv) (12/09/17 0719)  Bed Mobility Comments: supine>sit Min A for initiation of movement, simple verbal cues used (12/09/17 0719)    Transfers  Transfers  Sit to Stand: Minimal Assist (Min) (12/09/17 0719)  Stand to Sit: Minimal Assist (Min) (12/09/17 0719)  Assistive Device/: 2-wheeled walker;1 Person;Gait Belt (12/09/17 0719)  Transfer Comments 1: for safety and support, verbal cues for hand placement and technique, unsteadiness demonstrated in standing (12/09/17 0719)      Gait  Gait  Gait Assistance: Minimal Assist (Min) (12/09/17 0719)  Assistive Device/: 2-wheeled walker;1 Person;Gait Belt (12/09/17 0719)  Ambulation Distance (Feet): 100 Feet (12/09/17 0719)  Pattern: Shuffle;Foot flat R;Foot flat L (12/09/17 0719)  Ambulation Surface: Tile (12/09/17 0719)  Gait Comments 1: for safety and support, unsteadiness demonstrated without LOB, slow pace, occasional stops thorughout ambulation, verbal/ tactile cues for direction (12/09/17 0719)  Second Trial: Yes (12/09/17 0719), Gait - Second Trial  Gait Assistance: Minimal Assist (Min) (12/09/17 0719)  Assistive Device/: 1 Person;Gait Belt (12/09/17 0719)  Ambulation Distance (Feet): 15 Feet (12/09/17 0719)  Pattern: Shuffle;Foot flat R;Foot flat L (12/09/17 0719)  Ambulation Surface: Tile (12/09/17 0719)  Gait Comments 1: light Min A for safety, slow pace, unsteadiness demonstrated (12/09/17 0719)    Stairs          Neuromuscular Re-education       Balance  Balance  Sitting - Static: Supversion (Supv) (12/09/17 0719)  Sitting -  Dynamic: Supervision (Supv) (12/09/17 0719)  Standing - Static: Supervision (Supv) (12/09/17 0719)  Standing - Dynamic (eyes open): Minimal Assist (Min) (12/09/17 0719)  Balance Comments #1: for safety, unsteadiness demonstrated in standing, B UE support through 2WW (12/09/17 0719)    Wheelchair Mobility       Patient's Personal Goal: none stated (12/09/17 0719)    Therapy Goals:    Goals  Short Term Goals to Be Reviewed On: 12/16/17 (12/09/17 0719)  Short Term Goals = Discharge Goals: Yes (12/09/17 0719)  Goal Agreement: Patient agrees with goals and treatment plan (12/09/17 0719)  Bed Mobility Discharge Goal: all bed mobility supervision with HOB flat (12/09/17 0719)  Transfer Discharge Goal: all transfers supervision with LRAD (12/09/17 0719)  Ambulation Discharge Goal: ambulate >150ft supervision using LRAD (12/09/17 0719)  Stairs Discharge Goal: negotiate 3 steps with supervision using 1 rail (12/09/17 0719)        PT Time Spent: 50 minutes (12/09/17 0719)    See PT flowsheet for full details regarding the PT therapy provided.    Note sent for required physician co-signature: Yes         Is the patient currently receiving skilled home care services (RN or therapy) No                    Therapy Diagnosis: Weakness    Amount: 31-60 minutes  Frequency:     Duration: 10 days    Billing Information:    Timed Procedures:   , , ,  , , ,  , , $ Therapy Activities per 15 min: 23-37 mins (12/09/17 0719), , , ,     Untimed Procedures:   , , ,  , $ PT Eval: Moderate Complexity: 1 Procedure (12/09/17 0719),  ,  , ,      G-Codes:  $ Mobility - Current Status: CK (12/09/17 0719),$ Mobility - Goal: CJ (12/09/17 0719), ,  , , ,  , , ,  , , ,  , , ,  , ,     Total Treatment Time:  PT Time Spent: 50 minutes (12/09/17 0719)    Timed Treatment Minutes:  OBS Patients Only:  PT Timed Code TX Minutes: 30 minutes (12/09/17 0719)    The co-signature indicates that the physician certifies the need for PT furnished under this plan of  done treatment while under his/her care.

## 2024-10-20 NOTE — DISCHARGE NOTE OB - PATIENT PORTAL LINK FT
You can access the FollowMyHealth Patient Portal offered by [facility] by registering at the following website: [email or web address]. By joining [employer]’s FollowMyHealth portal, you will also be able to view your health information using other applications (apps) compatible with our system.

## 2024-10-20 NOTE — OB RN INTRAOPERATIVE NOTE - NS_DURAMORPH_OBGYN_ALL_OB
Detail Level: Zone Quality 226: Preventive Care And Screening: Tobacco Use: Screening And Cessation Intervention: Patient screened for tobacco use and is an ex/non-smoker Yes

## 2024-10-20 NOTE — CONSULT NOTE ADULT - SUBJECTIVE AND OBJECTIVE BOX
19yo  at 39 5/7 presented yesterday with contractions, no LOF or VB.  She moved to [place] from [place] with her  and has has uncomplicated PNC with Dr. Coley since 17 weeks.   No significant PMH or PSH.   Faroese is her primary language and consent was obtained using Faroese .     On ultrasound exam today, she is single footling breech with MALIKA 17cm, BPP 8/8.    Stamps with contractions every 5-7 minutes  FHR with moderate variability and accelerations, no decelerations

## 2024-10-20 NOTE — OB RN INTRAOPERATIVE NOTE - NSSELHIDDEN_OBGYN_ALL_OB_FT
[NS_DeliveryAttending1_OBGYN_ALL_OB_FT:[id number]],[NS_DeliveryAssist1_OBGYN_ALL_OB_FT:[id number]],[NS_DeliveryRN_OBGYN_ALL_OB_FT:[id number]]

## 2024-10-20 NOTE — OB PROVIDER DELIVERY SUMMARY - NSPROVIDERDELIVERYNOTE_OBGYN_ALL_OB_FT
pLTCS for breech presentation at 39w5d s/p failed ECV    Small approx 2cm fibroid at posterior L cornua.  Grossly normal uterus, bilateral fallopian tubes & ovaries  Liveborn female infant, apgars: 5/8, weight: 3030g  Footling breech presentation, clear copious fluid  Short umbilical cord noted  Hysterotomy closed in single layer with caprosyn suture, small L inferior hysterotomy extension incorporated into hysterotomy closure  Interceed placed over hysterotomy  Peritoneum closed with plain gut suture  Rectus layer closed with chromic suture  Fascia layer closed with vicryl suture  Subcutaneous tissue reapproximated with plain gut suture  Skin closed in subcuticular fashion with monocryl suture    QBL: 455  IVF: 1450  UOP: 100  Dictation Number: pending pLTCS for breech presentation at 39w5d s/p failed ECV    Small approx 2cm fibroid at posterior L cornua.  Grossly normal uterus, bilateral fallopian tubes & ovaries  Liveborn female infant, apgars: 5/8, weight: 3030g  Footling breech presentation, clear copious fluid  Short umbilical cord noted  Hysterotomy closed in single layer with caprosyn suture, small L inferior hysterotomy extension incorporated into hysterotomy closure  Interceed placed over hysterotomy  Peritoneum closed with plain gut suture  Rectus layer closed with chromic suture  Fascia layer closed with vicryl suture  Subcutaneous tissue reapproximated with plain gut suture  Skin closed in subcuticular fashion with monocryl suture    QBL: 455  IVF: 1450  UOP: 100  Dictation Number: [id number]

## 2024-10-20 NOTE — CONSULT NOTE ADULT - ASSESSMENT
17yo  at 39 5/7 not in labor desiring ECV.     The risks, benefits, alternatives, and approximate 50% success rate of ECV was explained in detail.   Informed consent signed and placed on chart.   Epidural placed.   Terbutaline administered for uterine relaxation.           Shiv PEREZ 17yo  at 39 5/7 not in labor desiring ECV.     The risks, benefits, alternatives, and approximate 50% success rate of ECV was explained in detail.   Informed consent signed and placed on chart.   Epidural placed and loaded.   Terbutaline administered for uterine relaxation.   External cephalic version attempt was unsuccessful.   For primary  delivery.   Dr. Kunz informed.         Shiv PEREZ

## 2024-10-20 NOTE — DISCHARGE NOTE OB - FINANCIAL ASSISTANCE
[facility] provides services at a reduced cost to those who are determined to be eligible through [facility]’s financial assistance program. Information regarding [facility]’s financial assistance program can be found by going to [email or web address] or by calling [phone].

## 2024-10-20 NOTE — OB RN DELIVERY SUMMARY - NSSELHIDDEN_OBGYN_ALL_OB_FT
[NS_DeliveryAttending1_OBGYN_ALL_OB_FT:[id number]],[NS_DeliveryAssist1_OBGYN_ALL_OB_FT:[id number]],[NS_DeliveryRN_OBGYN_ALL_OB_FT:[id number]=]

## 2024-10-21 LAB
BASOPHILS # BLD AUTO: 0.02 K/UL — SIGNIFICANT CHANGE UP (ref 0–0.2)
BASOPHILS NFR BLD AUTO: 0.1 % — SIGNIFICANT CHANGE UP (ref 0–2)
EOSINOPHIL # BLD AUTO: 0 K/UL — SIGNIFICANT CHANGE UP (ref 0–0.5)
EOSINOPHIL NFR BLD AUTO: 0 % — SIGNIFICANT CHANGE UP (ref 0–6)
HCT VFR BLD CALC: 26.6 % — LOW (ref 34.5–45)
HGB BLD-MCNC: 8.8 G/DL — LOW (ref 11.5–15.5)
IANC: 16.04 K/UL — HIGH (ref 1.8–7.4)
IMM GRANULOCYTES NFR BLD AUTO: 0.5 % — SIGNIFICANT CHANGE UP (ref 0–0.9)
LYMPHOCYTES # BLD AUTO: 0.54 K/UL — LOW (ref 1–3.3)
LYMPHOCYTES # BLD AUTO: 3.1 % — LOW (ref 13–44)
MCHC RBC-ENTMCNC: 28.8 PG — SIGNIFICANT CHANGE UP (ref 27–34)
MCHC RBC-ENTMCNC: 33.1 GM/DL — SIGNIFICANT CHANGE UP (ref 32–36)
MCV RBC AUTO: 86.9 FL — SIGNIFICANT CHANGE UP (ref 80–100)
MONOCYTES # BLD AUTO: 0.55 K/UL — SIGNIFICANT CHANGE UP (ref 0–0.9)
MONOCYTES NFR BLD AUTO: 3.2 % — SIGNIFICANT CHANGE UP (ref 2–14)
NEUTROPHILS # BLD AUTO: 16.04 K/UL — HIGH (ref 1.8–7.4)
NEUTROPHILS NFR BLD AUTO: 93.1 % — HIGH (ref 43–77)
NRBC # BLD: 0 /100 WBCS — SIGNIFICANT CHANGE UP (ref 0–0)
NRBC # FLD: 0 K/UL — SIGNIFICANT CHANGE UP (ref 0–0)
PLATELET # BLD AUTO: 236 K/UL — SIGNIFICANT CHANGE UP (ref 150–400)
RBC # BLD: 3.06 M/UL — LOW (ref 3.8–5.2)
RBC # FLD: 13.4 % — SIGNIFICANT CHANGE UP (ref 10.3–14.5)
T PALLIDUM AB TITR SER: NEGATIVE — SIGNIFICANT CHANGE UP
WBC # BLD: 17.24 K/UL — HIGH (ref 3.8–10.5)
WBC # FLD AUTO: 17.24 K/UL — HIGH (ref 3.8–10.5)

## 2024-10-21 RX ORDER — ASCORBIC ACID 500 MG
500 TABLET ORAL DAILY
Refills: 0 | Status: DISCONTINUED | OUTPATIENT
Start: 2024-10-21 | End: 2024-10-23

## 2024-10-21 RX ORDER — FERROUS SULFATE 325(65) MG
325 TABLET ORAL THREE TIMES A DAY
Refills: 0 | Status: DISCONTINUED | OUTPATIENT
Start: 2024-10-21 | End: 2024-10-23

## 2024-10-21 RX ORDER — SENNA 187 MG
1 TABLET ORAL
Refills: 0 | Status: DISCONTINUED | OUTPATIENT
Start: 2024-10-21 | End: 2024-10-23

## 2024-10-21 RX ORDER — IBUPROFEN 200 MG
600 TABLET ORAL EVERY 6 HOURS
Refills: 0 | Status: DISCONTINUED | OUTPATIENT
Start: 2024-10-21 | End: 2024-10-23

## 2024-10-21 RX ORDER — DIPHENHYDRAMINE HCL 12.5MG/5ML
25 ELIXIR ORAL EVERY 6 HOURS
Refills: 0 | Status: DISCONTINUED | OUTPATIENT
Start: 2024-10-21 | End: 2024-10-23

## 2024-10-21 RX ADMIN — Medication 975 MILLIGRAM(S): at 14:23

## 2024-10-21 RX ADMIN — KETOROLAC TROMETHAMINE 30 MILLIGRAM(S): 30 INJECTION INTRAMUSCULAR; INTRAVENOUS at 12:25

## 2024-10-21 RX ADMIN — Medication 325 MILLIGRAM(S): at 21:33

## 2024-10-21 RX ADMIN — Medication 975 MILLIGRAM(S): at 21:25

## 2024-10-21 RX ADMIN — HEPARIN SODIUM 5000 UNIT(S): 10000 INJECTION INTRAVENOUS; SUBCUTANEOUS at 17:27

## 2024-10-21 RX ADMIN — Medication 975 MILLIGRAM(S): at 15:10

## 2024-10-21 RX ADMIN — KETOROLAC TROMETHAMINE 30 MILLIGRAM(S): 30 INJECTION INTRAMUSCULAR; INTRAVENOUS at 17:28

## 2024-10-21 RX ADMIN — Medication 975 MILLIGRAM(S): at 00:37

## 2024-10-21 RX ADMIN — KETOROLAC TROMETHAMINE 30 MILLIGRAM(S): 30 INJECTION INTRAMUSCULAR; INTRAVENOUS at 06:38

## 2024-10-21 RX ADMIN — KETOROLAC TROMETHAMINE 30 MILLIGRAM(S): 30 INJECTION INTRAMUSCULAR; INTRAVENOUS at 18:20

## 2024-10-21 RX ADMIN — Medication 25 MILLIGRAM(S): at 02:46

## 2024-10-21 RX ADMIN — SIMETHICONE 80 MILLIGRAM(S): 80 TABLET, CHEWABLE ORAL at 21:33

## 2024-10-21 RX ADMIN — Medication 500 MILLIGRAM(S): at 17:28

## 2024-10-21 RX ADMIN — SIMETHICONE 80 MILLIGRAM(S): 80 TABLET, CHEWABLE ORAL at 10:27

## 2024-10-21 RX ADMIN — HEPARIN SODIUM 5000 UNIT(S): 10000 INJECTION INTRAVENOUS; SUBCUTANEOUS at 06:38

## 2024-10-21 RX ADMIN — KETOROLAC TROMETHAMINE 30 MILLIGRAM(S): 30 INJECTION INTRAMUSCULAR; INTRAVENOUS at 11:47

## 2024-10-21 RX ADMIN — Medication 1000 MILLILITER(S): at 06:39

## 2024-10-21 RX ADMIN — Medication 1 TABLET(S): at 17:28

## 2024-10-21 RX ADMIN — Medication 975 MILLIGRAM(S): at 08:00

## 2024-10-21 RX ADMIN — Medication 975 MILLIGRAM(S): at 09:00

## 2024-10-21 RX ADMIN — KETOROLAC TROMETHAMINE 30 MILLIGRAM(S): 30 INJECTION INTRAMUSCULAR; INTRAVENOUS at 07:32

## 2024-10-21 NOTE — PROGRESS NOTE ADULT - ASSESSMENT
A/P: 19yo POD#2 s/p pLTCS for breech presentation. QBL 445ccs. Hct 32.1->26.6. Patient is stable and doing well post-operatively.    - Continue regular diet.  - Increase ambulation.  - Continue HSQ and venodynes in bed for DVT prophylaxis.  - Continue motrin, tylenol, oxycodone PRN for pain control.     Torri Ureña MD PGY1
18y  admitted for early labor in breech presentation. Patient admitted for ECV with MFM in the AM.    Plan  - NPO at [place], LR@125  - Plan for ECV    HSinks PGY3

## 2024-10-22 RX ADMIN — HEPARIN SODIUM 5000 UNIT(S): 10000 INJECTION INTRAVENOUS; SUBCUTANEOUS at 06:28

## 2024-10-22 RX ADMIN — Medication 1 TABLET(S): at 06:28

## 2024-10-22 RX ADMIN — MAGNESIUM HYDROXIDE 30 MILLILITER(S): 1200 SUSPENSION ORAL at 21:30

## 2024-10-22 RX ADMIN — Medication 325 MILLIGRAM(S): at 14:26

## 2024-10-22 RX ADMIN — Medication 600 MILLIGRAM(S): at 01:01

## 2024-10-22 RX ADMIN — Medication 975 MILLIGRAM(S): at 15:18

## 2024-10-22 RX ADMIN — Medication 500 MILLIGRAM(S): at 11:52

## 2024-10-22 RX ADMIN — Medication 975 MILLIGRAM(S): at 22:43

## 2024-10-22 RX ADMIN — Medication 975 MILLIGRAM(S): at 14:26

## 2024-10-22 RX ADMIN — Medication 975 MILLIGRAM(S): at 08:17

## 2024-10-22 RX ADMIN — Medication 600 MILLIGRAM(S): at 18:05

## 2024-10-22 RX ADMIN — Medication 600 MILLIGRAM(S): at 00:31

## 2024-10-22 RX ADMIN — SIMETHICONE 80 MILLIGRAM(S): 80 TABLET, CHEWABLE ORAL at 21:30

## 2024-10-22 RX ADMIN — Medication 600 MILLIGRAM(S): at 17:28

## 2024-10-22 RX ADMIN — Medication 600 MILLIGRAM(S): at 06:28

## 2024-10-22 RX ADMIN — Medication 325 MILLIGRAM(S): at 06:28

## 2024-10-22 RX ADMIN — Medication 600 MILLIGRAM(S): at 23:44

## 2024-10-22 RX ADMIN — Medication 975 MILLIGRAM(S): at 21:30

## 2024-10-22 RX ADMIN — Medication 975 MILLIGRAM(S): at 09:15

## 2024-10-22 RX ADMIN — Medication 600 MILLIGRAM(S): at 12:53

## 2024-10-22 RX ADMIN — Medication 325 MILLIGRAM(S): at 21:30

## 2024-10-22 RX ADMIN — Medication 1 TABLET(S): at 17:28

## 2024-10-22 RX ADMIN — HEPARIN SODIUM 5000 UNIT(S): 10000 INJECTION INTRAVENOUS; SUBCUTANEOUS at 17:28

## 2024-10-22 RX ADMIN — Medication 975 MILLIGRAM(S): at 03:34

## 2024-10-22 RX ADMIN — Medication 975 MILLIGRAM(S): at 03:04

## 2024-10-22 RX ADMIN — Medication 600 MILLIGRAM(S): at 11:53

## 2024-10-22 RX ADMIN — Medication 600 MILLIGRAM(S): at 06:58

## 2024-10-23 VITALS
OXYGEN SATURATION: 100 % | SYSTOLIC BLOOD PRESSURE: 116 MMHG | DIASTOLIC BLOOD PRESSURE: 82 MMHG | HEART RATE: 86 BPM | TEMPERATURE: 98 F | RESPIRATION RATE: 18 BRPM

## 2024-10-23 RX ORDER — POLYETHYLENE GLYCOL 3350 17 G/17G
17 POWDER, FOR SOLUTION ORAL DAILY
Refills: 0 | Status: DISCONTINUED | OUTPATIENT
Start: 2024-10-23 | End: 2024-10-23

## 2024-10-23 RX ORDER — PRENATAL VIT/IRON FUM/FOLIC AC 60 MG-1 MG
1 TABLET ORAL DAILY
Refills: 0 | Status: DISCONTINUED | OUTPATIENT
Start: 2024-10-23 | End: 2024-10-23

## 2024-10-23 RX ORDER — PRENATAL VIT/IRON FUM/FOLIC AC 60 MG-1 MG
1 TABLET ORAL
Qty: 0 | Refills: 0 | DISCHARGE
Start: 2024-10-23

## 2024-10-23 RX ORDER — IBUPROFEN 200 MG
1 TABLET ORAL
Qty: 0 | Refills: 0 | DISCHARGE
Start: 2024-10-23

## 2024-10-23 RX ORDER — ACETAMINOPHEN 500 MG
3 TABLET ORAL
Qty: 0 | Refills: 0 | DISCHARGE
Start: 2024-10-23

## 2024-10-23 RX ADMIN — Medication 500 MILLIGRAM(S): at 11:52

## 2024-10-23 RX ADMIN — MAGNESIUM HYDROXIDE 30 MILLILITER(S): 1200 SUSPENSION ORAL at 08:34

## 2024-10-23 RX ADMIN — Medication 600 MILLIGRAM(S): at 11:52

## 2024-10-23 RX ADMIN — Medication 975 MILLIGRAM(S): at 04:30

## 2024-10-23 RX ADMIN — Medication 1 TABLET(S): at 11:52

## 2024-10-23 RX ADMIN — SIMETHICONE 80 MILLIGRAM(S): 80 TABLET, CHEWABLE ORAL at 08:34

## 2024-10-23 RX ADMIN — Medication 975 MILLIGRAM(S): at 03:22

## 2024-10-23 RX ADMIN — Medication 1 TABLET(S): at 06:16

## 2024-10-23 RX ADMIN — Medication 975 MILLIGRAM(S): at 08:29

## 2024-10-23 RX ADMIN — Medication 600 MILLIGRAM(S): at 12:30

## 2024-10-23 RX ADMIN — Medication 975 MILLIGRAM(S): at 09:00

## 2024-10-23 RX ADMIN — Medication 600 MILLIGRAM(S): at 00:48

## 2024-10-23 RX ADMIN — POLYETHYLENE GLYCOL 3350 17 GRAM(S): 17 POWDER, FOR SOLUTION ORAL at 11:52

## 2024-10-23 RX ADMIN — Medication 325 MILLIGRAM(S): at 06:16

## 2024-10-23 RX ADMIN — Medication 600 MILLIGRAM(S): at 06:16

## 2024-10-23 RX ADMIN — HEPARIN SODIUM 5000 UNIT(S): 10000 INJECTION INTRAVENOUS; SUBCUTANEOUS at 06:16

## 2024-10-23 RX ADMIN — Medication 325 MILLIGRAM(S): at 11:53

## 2024-10-23 NOTE — PROGRESS NOTE ADULT - SUBJECTIVE AND OBJECTIVE BOX
OB Postpartum Note: Primary  Delivery, POD#3    S: 17yo POD#3 s/p pLTCS for breech presentation. The patient feels well, however c/o R&L shoulder pain, and breast pain. She is tolerating a regular diet, however patient states she is not passing flatus. She is voiding spontaneously, and ambulating without difficulty. Denies CP/SOB. Denies lightheadedness/dizziness. Denies N/V.    O:  Vitals:  Vital Signs Last 24 Hrs  T(C): 37.1 (23 Oct 2024 06:00), Max: 37.3 (22 Oct 2024 22:05)  T(F): 98.7 (23 Oct 2024 06:00), Max: 99.2 (22 Oct 2024 22:05)  HR: 88 (23 Oct 2024 06:00) (77 - 88)  BP: 121/65 (23 Oct 2024 06:00) (118/66 - 124/71)  BP(mean): --  RR: 16 (23 Oct 2024 06:00) (16 - 18)  SpO2: 100% (23 Oct 2024 06:00) (99% - 100%)        MEDICATIONS  (STANDING):  acetaminophen     Tablet .. 975 milliGRAM(s) Oral <User Schedule>  ascorbic acid 500 milliGRAM(s) Oral daily  diphtheria/tetanus/pertussis (acellular) Vaccine (Adacel) 0.5 milliLiter(s) IntraMuscular once  ferrous    sulfate 325 milliGRAM(s) Oral three times a day  heparin   Injectable 5000 Unit(s) SubCutaneous every 12 hours  ibuprofen  Tablet. 600 milliGRAM(s) Oral every 6 hours  senna 1 Tablet(s) Oral two times a day    MEDICATIONS  (PRN):  diphenhydrAMINE 25 milliGRAM(s) Oral every 6 hours PRN Pruritus  lanolin Ointment 1 Application(s) Topical every 6 hours PRN Sore Nipples  magnesium hydroxide Suspension 30 milliLiter(s) Oral two times a day PRN Constipation  oxyCODONE    IR 5 milliGRAM(s) Oral once PRN Moderate to Severe Pain (4-10)  oxyCODONE    IR 5 milliGRAM(s) Oral every 3 hours PRN Moderate to Severe Pain (4-10)  simethicone 80 milliGRAM(s) Chew every 4 hours PRN Gas      LABS:  Blood type: A Positive  Rubella IgG: RPR: Negative                          8.8[L]   17.24[H] >-----------< 236    ( 10-21 @ 05:43 )             26.6[L]                  Physical exam:  Gen: NAD  Abdomen: Soft, nontender, no distension , firm uterine fundus at umbilicus. +BS in all 4 quadrants  Incision: Clean, dry, and intact   Breast: Engorged bilaterally, no erythema, streaks or drainage from nipples  Pelvic: Normal lochia noted  Ext: No calf tenderness    A/P: 17yo POD#3 s/p pLTCS.  Patient is stable and is doing well post-operatively.    #gas pain  - Simethicone PRN  - Encouraged to increase ambulation  - Tom/Ginger ale, chewing gum  - Senna, MOM    #breast engorgement  - RN to contact lactation consultant  - Encouraged and offered assistance with putting baby to breast  - signs of mastitis reviewed with patient    #Postpartum  - Incision care reviewed. Instructed patient to schedule incision check at OB office at 2 weeks of discharge  - Continue motrin, tylenol, oxycodone PRN for pain control.  - Increase ambulation  - Continue regular diet  - Discharge planned for today      Doris Frost FNP-BC        
Postop Day  1  s/p   C- Section    THERAPY:  [ X] Spinal morphine     Sedation Score:	  [ X] Alert	    [  ] Drowsy        [  ] Arousable	[  ] Asleep	[  ] Unresponsive    Side Effects:	  [ X] None	     [  ] Nausea        [  ] Pruritus        [  ] Weakness   [  ] Numbness      Denies headache, able to ambulate, tolerating diet, voiding without issues.    No apparent anesthesia complications  
Postpartum Note,  Section  She is a  18y woman who is now post-operative day: 1    Subjective:  The patient feels well.  She is ambulating.   She is tolerating regular diet.  She denies nausea and vomiting.  She is voiding.  Her pain is controlled.  She reports normal postpartum bleeding.    Vital Signs Last 24 Hrs  T(C): 36.8 (21 Oct 2024 05:48), Max: 36.8 (21 Oct 2024 01:47)  T(F): 98.2 (21 Oct 2024 05:48), Max: 98.3 (21 Oct 2024 04:24)  HR: 83 (21 Oct 2024 05:48) (58 - 110)  BP: 110/53 (21 Oct 2024 05:48) (88/49 - 154/104)  BP(mean): 79 (21 Oct 2024 00:30) (79 - 97)  RR: 16 (21 Oct 2024 00:45) (11 - 24)  SpO2: 97% (21 Oct 2024 05:48) (85% - 100%)    Parameters below as of 20 Oct 2024 13:46  Patient On (Oxygen Delivery Method): room air        Physical exam:  Gen: NAD  Breast: Soft, nontender, not engorged.  Abdomen: Soft, nontender, no distension , firm uterine fundus at umbilicus.  Incision: Clean, dry, and intact   Pelvic: Normal lochia noted  Ext: No calf tenderness    LABS:                        8.8    17.24 )-----------( 236      ( 21 Oct 2024 05:43 )             26.6         Allergies    No Known Allergies    Intolerances      MEDICATIONS  (STANDING):  acetaminophen     Tablet .. 975 milliGRAM(s) Oral <User Schedule>  diphtheria/tetanus/pertussis (acellular) Vaccine (Adacel) 0.5 milliLiter(s) IntraMuscular once  heparin   Injectable 5000 Unit(s) SubCutaneous every 12 hours  ibuprofen  Tablet. 600 milliGRAM(s) Oral every 6 hours  ketorolac   Injectable 30 milliGRAM(s) IV Push every 6 hours  lactated ringers. 1000 milliLiter(s) (75 mL/Hr) IV Continuous <Continuous>  oxytocin Infusion 42 milliUNIT(s)/Min (42 mL/Hr) IV Continuous <Continuous>    MEDICATIONS  (PRN):  dexAMETHasone  Injectable 4 milliGRAM(s) IV Push every 6 hours PRN Nausea  diphenhydrAMINE 25 milliGRAM(s) Oral every 6 hours PRN Pruritus  lanolin Ointment 1 Application(s) Topical every 6 hours PRN Sore Nipples  magnesium hydroxide Suspension 30 milliLiter(s) Oral two times a day PRN Constipation  naloxone Injectable 0.1 milliGRAM(s) IV Push every 3 minutes PRN For ANY of the following changes in patient status:  A. Breaths Per Minute LESS THAN 10, B. Oxygen saturation LESS THAN 90%, C. Sedation score of 6 for Stop After: 4 Times  ondansetron Injectable 4 milliGRAM(s) IV Push every 6 hours PRN Nausea  oxyCODONE    IR 5 milliGRAM(s) Oral every 3 hours PRN Moderate to Severe Pain (4-10)  oxyCODONE    IR 5 milliGRAM(s) Oral once PRN Moderate to Severe Pain (4-10)  simethicone 80 milliGRAM(s) Chew every 4 hours PRN Gas        Assessment and Plan  POD # 1 s/p  section  Doing well.  Encourage ambulation.        
Postpartum Note,  Section  She is a  18y woman who is now post-operative day: 2    Subjective:  The patient feels well.  She is ambulating.   She is tolerating regular diet.  She denies nausea and vomiting.  She is voiding.  Her pain is controlled.  She reports normal postpartum bleeding.  Want's to stay today.     Vital Signs Last 24 Hrs  T(C): 36.7 (22 Oct 2024 05:27), Max: 36.9 (21 Oct 2024 14:00)  T(F): 98 (22 Oct 2024 05:27), Max: 98.4 (21 Oct 2024 14:00)  HR: 68 (22 Oct 2024 05:27) (68 - 79)  BP: 107/51 (22 Oct 2024 05:27) (104/52 - 110/58)  BP(mean): --  RR: 16 (22 Oct 2024 05:27) (16 - 18)  SpO2: 97% (22 Oct 2024 05:27) (97% - 100%)        Physical exam:  Gen: NAD  Breast: Soft, nontender, not engorged.  Abdomen: Soft, nontender, no distension , firm uterine fundus at umbilicus.  Incision: Clean, dry, and intact with steri strips  Pelvic: Normal lochia noted  Ext: No calf tenderness    LABS:                        8.8    17.24 )-----------( 236      ( 21 Oct 2024 05:43 )             26.6         Allergies    No Known Allergies    Intolerances      MEDICATIONS  (STANDING):  acetaminophen     Tablet .. 975 milliGRAM(s) Oral <User Schedule>  ascorbic acid 500 milliGRAM(s) Oral daily  diphtheria/tetanus/pertussis (acellular) Vaccine (Adacel) 0.5 milliLiter(s) IntraMuscular once  ferrous    sulfate 325 milliGRAM(s) Oral three times a day  heparin   Injectable 5000 Unit(s) SubCutaneous every 12 hours  ibuprofen  Tablet. 600 milliGRAM(s) Oral every 6 hours  senna 1 Tablet(s) Oral two times a day    MEDICATIONS  (PRN):  diphenhydrAMINE 25 milliGRAM(s) Oral every 6 hours PRN Pruritus  lanolin Ointment 1 Application(s) Topical every 6 hours PRN Sore Nipples  magnesium hydroxide Suspension 30 milliLiter(s) Oral two times a day PRN Constipation  oxyCODONE    IR 5 milliGRAM(s) Oral once PRN Moderate to Severe Pain (4-10)  oxyCODONE    IR 5 milliGRAM(s) Oral every 3 hours PRN Moderate to Severe Pain (4-10)  simethicone 80 milliGRAM(s) Chew every 4 hours PRN Gas        Assessment and Plan  POD # 2 s/p  section  Doing well.  Encourage ambulation.  Discharge tomorrow.       
R3 Antepartum Note, HD#2    Patient seen and examined at bedside, no acute overnight events. No acute complaints. Pt reports +FM, denies LOF, VB, ctx, HA, epigastric pain, blurred vision, CP, SOB, N/V, fevers, and chills. She is still requesting and ECV.    Vital Signs Last 24 Hours  T(C): 36.6 (10-20-24 @ 06:06), Max: 36.9 (10-19-24 @ 18:44)  HR: 58 (10-20-24 @ 06:06) (58 - 97)  BP: 108/68 (10-20-24 @ 06:06) (105/67 - 116/71)  RR: 18 (10-20-24 @ 06:06) (16 - 20)  SpO2: 99% (10-20-24 @ 06:06) (99% - 99%)    CAPILLARY BLOOD GLUCOSE          Physical Exam:  General: NAD  Abdomen: Soft, non-tender, gravid  Ext: No pain or swelling    NST reactive overnight    Labs:             10.4   11.90 )-----------( 274      ( 10-19 @ 18:30 )             32.1                   MEDICATIONS  (STANDING):  chlorhexidine 2% Cloths 1 Application(s) Topical daily  lactated ringers Bolus 1000 milliLiter(s) IV Bolus once  lactated ringers Bolus 1000 milliLiter(s) IV Bolus once  lactated ringers Bolus 1000 milliLiter(s) IV Bolus once  lactated ringers. 1000 milliLiter(s) (125 mL/Hr) IV Continuous <Continuous>  oxytocin Infusion 167 milliUNIT(s)/Min (167 mL/Hr) IV Continuous <Continuous>    MEDICATIONS  (PRN):  
OB Progress Note: LTCS, POD#2    S: 19yo POD#2 s/p pLTCS for breech presentation. Pain is well controlled. She is tolerating a regular diet and passing flatus. She is voiding spontaneously, and ambulating without difficulty. Denies CP/SOB. Denies lightheadedness/dizziness. Denies N/V.    O:  Vitals:  Vital Signs Last 24 Hrs  T(C): 36.8 (21 Oct 2024 05:48), Max: 36.8 (21 Oct 2024 01:47)  T(F): 98.2 (21 Oct 2024 05:48), Max: 98.3 (21 Oct 2024 04:24)  HR: 83 (21 Oct 2024 05:48) (58 - 110)  BP: 110/53 (21 Oct 2024 05:48) (88/49 - 154/104)  BP(mean): 79 (21 Oct 2024 00:30) (79 - 97)  RR: 16 (21 Oct 2024 00:45) (11 - 24)  SpO2: 97% (21 Oct 2024 05:48) (85% - 100%)    Parameters below as of 20 Oct 2024 13:46  Patient On (Oxygen Delivery Method): room air        MEDICATIONS  (STANDING):  acetaminophen     Tablet .. 975 milliGRAM(s) Oral <User Schedule>  diphtheria/tetanus/pertussis (acellular) Vaccine (Adacel) 0.5 milliLiter(s) IntraMuscular once  heparin   Injectable 5000 Unit(s) SubCutaneous every 12 hours  ibuprofen  Tablet. 600 milliGRAM(s) Oral every 6 hours  ketorolac   Injectable 30 milliGRAM(s) IV Push every 6 hours  lactated ringers. 1000 milliLiter(s) (75 mL/Hr) IV Continuous <Continuous>  oxytocin Infusion 42 milliUNIT(s)/Min (42 mL/Hr) IV Continuous <Continuous>      MEDICATIONS  (PRN):  dexAMETHasone  Injectable 4 milliGRAM(s) IV Push every 6 hours PRN Nausea  diphenhydrAMINE 25 milliGRAM(s) Oral every 6 hours PRN Pruritus  lanolin Ointment 1 Application(s) Topical every 6 hours PRN Sore Nipples  magnesium hydroxide Suspension 30 milliLiter(s) Oral two times a day PRN Constipation  naloxone Injectable 0.1 milliGRAM(s) IV Push every 3 minutes PRN For ANY of the following changes in patient status:  A. Breaths Per Minute LESS THAN 10, B. Oxygen saturation LESS THAN 90%, C. Sedation score of 6 for Stop After: 4 Times  ondansetron Injectable 4 milliGRAM(s) IV Push every 6 hours PRN Nausea  oxyCODONE    IR 5 milliGRAM(s) Oral once PRN Moderate to Severe Pain (4-10)  oxyCODONE    IR 5 milliGRAM(s) Oral every 3 hours PRN Moderate to Severe Pain (4-10)  simethicone 80 milliGRAM(s) Chew every 4 hours PRN Gas      Labs:  Blood type: A Positive  Rubella IgG: RPR: Negative                          8.8[L]   17.24[H] >-----------< 236    ( 10-21 @ 05:43 )             26.6[L]                        10.4[L]   11.90[H] >-----------< 274    ( 10-19 @ 18:30 )             32.1[L]                  PE:  General: NAD  Abdomen: Soft, appropriately tender, incision c/d/i.  Extremities: No erythema, no pitting edema    
[facility] #[id number]    S: 17yo POD#2 s/p pLTCS for breech. Pain is well controlled. She is tolerating a regular diet but reports not passing flatus. She is voiding spontaneously, and ambulating without difficulty. Denies CP/SOB. Denies lightheadedness/dizziness. Denies N/V.    O:  Vitals:  Vital Signs Last 24 Hrs  T(C): 36.7 (22 Oct 2024 05:27), Max: 37.3 (21 Oct 2024 10:00)  T(F): 98 (22 Oct 2024 05:27), Max: 99.1 (21 Oct 2024 10:00)  HR: 68 (22 Oct 2024 05:27) (68 - 79)  BP: 107/51 (22 Oct 2024 05:27) (104/52 - 110/58)  BP(mean): --  RR: 16 (22 Oct 2024 05:27) (16 - 18)  SpO2: 97% (22 Oct 2024 05:27) (97% - 100%)        MEDICATIONS  (STANDING):  acetaminophen     Tablet .. 975 milliGRAM(s) Oral <User Schedule>  ascorbic acid 500 milliGRAM(s) Oral daily  diphtheria/tetanus/pertussis (acellular) Vaccine (Adacel) 0.5 milliLiter(s) IntraMuscular once  ferrous    sulfate 325 milliGRAM(s) Oral three times a day  heparin   Injectable 5000 Unit(s) SubCutaneous every 12 hours  ibuprofen  Tablet. 600 milliGRAM(s) Oral every 6 hours  senna 1 Tablet(s) Oral two times a day      MEDICATIONS  (PRN):  diphenhydrAMINE 25 milliGRAM(s) Oral every 6 hours PRN Pruritus  lanolin Ointment 1 Application(s) Topical every 6 hours PRN Sore Nipples  magnesium hydroxide Suspension 30 milliLiter(s) Oral two times a day PRN Constipation  oxyCODONE    IR 5 milliGRAM(s) Oral once PRN Moderate to Severe Pain (4-10)  oxyCODONE    IR 5 milliGRAM(s) Oral every 3 hours PRN Moderate to Severe Pain (4-10)  simethicone 80 milliGRAM(s) Chew every 4 hours PRN Gas      Labs:  Blood type: A Positive  Rubella IgG: RPR: Negative                          8.8[L]   17.24[H] >-----------< 236    ( 10-21 @ 05:43 )             26.6[L]                        10.4[L]   11.90[H] >-----------< 274    ( 10-19 @ 18:30 )             32.1[L]      PE:  General: NAD  Abdomen: Soft, appropriately tender, incision c/d/i.  Lochia: WNL  Extremities: No calf tenderness    A/P: 17yo POD#2 s/p pLTCS.  Patient is stable and doing well post-operatively.    - Continue regular diet.  - Increase ambulation.  - Encouraged use of abdominal binder  - Continue motrin, tylenol, oxycodone PRN for pain control.  - Discharge planning      Suzy CHUNG